# Patient Record
Sex: FEMALE | Race: WHITE | NOT HISPANIC OR LATINO | Employment: UNEMPLOYED | URBAN - METROPOLITAN AREA
[De-identification: names, ages, dates, MRNs, and addresses within clinical notes are randomized per-mention and may not be internally consistent; named-entity substitution may affect disease eponyms.]

---

## 2017-01-05 ENCOUNTER — ROUTINE PRENATAL (OUTPATIENT)
Dept: OBSTETRICS AND GYNECOLOGY | Facility: CLINIC | Age: 27
End: 2017-01-05
Payer: MEDICAID

## 2017-01-05 VITALS
BODY MASS INDEX: 35.13 KG/M2 | WEIGHT: 231.06 LBS | DIASTOLIC BLOOD PRESSURE: 72 MMHG | SYSTOLIC BLOOD PRESSURE: 120 MMHG

## 2017-01-05 DIAGNOSIS — Z34.82 ENCOUNTER FOR SUPERVISION OF OTHER NORMAL PREGNANCY IN SECOND TRIMESTER: Primary | ICD-10-CM

## 2017-01-05 DIAGNOSIS — Z36.89 ENCOUNTER FOR FETAL ANATOMIC SURVEY: ICD-10-CM

## 2017-01-05 PROCEDURE — 99212 OFFICE O/P EST SF 10 MIN: CPT | Mod: PBBFAC | Performed by: OBSTETRICS & GYNECOLOGY

## 2017-01-05 PROCEDURE — 99213 OFFICE O/P EST LOW 20 MIN: CPT | Mod: TH,S$PBB,, | Performed by: OBSTETRICS & GYNECOLOGY

## 2017-01-05 PROCEDURE — 99999 PR PBB SHADOW E&M-EST. PATIENT-LVL II: CPT | Mod: PBBFAC,,, | Performed by: OBSTETRICS & GYNECOLOGY

## 2017-01-05 NOTE — MR AVS SNAPSHOT
[facility] - [facility]/ [facility]  [address] [facility]  [place] [address]  Phone: [phone]  Fax: [phone]                  Yaa Richmond   2017 8:45 AM   Routine Prenatal    Description:  Female : 1990   Provider:  Rossi Gayle CNM   Department:  [facility] - [facility]/ [facility]           Diagnoses this Visit        Comments    Encounter for supervision of other normal pregnancy in second trimester    -  Primary     Encounter for lactation counseling         Encounter for fetal anatomic survey                To Do List           Future Appointments        Provider Department Dept Phone    2017 12:30 PM ULTRASOUND, OB-GYN [facility] OB/ -[phone]    2017 1:30 PM [facility] OB/ -[phone]      Goals (5 Years of Data)     None      [facility]     [facility] On Call Nurse [facility] -  Assistance  Registered nurses in the [facility] Center provide clinical advisement, health education, appointment booking, and other advisory services.  Call for this free service at [phone].             Medications           Message regarding Medications     Verify the changes and/or additions to your medication regime listed below are the same as discussed with your clinician today.  If any of these changes or additions are incorrect, please notify your healthcare provider.             Verify that the below list of medications is an accurate representation of the medications you are currently taking.  If none reported, the list may be blank. If incorrect, please contact your healthcare provider. Carry this list with you in case of emergency.           Current Medications     promethazine (PHENERGAN) 25 MG tablet Take 1 tablet (25 mg total) by mouth every 4 (four) hours as needed for Nausea.           Clinical Reference Information           Prenatal Vitals     Enc. Date GA Prenatal Vitals Prenatal Pulse Pain Level Urine Albumin/Glucose Edema Presentation  Dilation/Effacement/Station    17 16w5d 120/72 / 104.8 kg (231 lb 0.7 oz)  /  / Present  0        16 12w4d 122/84 / 102.7 kg (226 lb 6.6 oz)  / 144 / Absent   Negative / Negative None / None / None / No      16 8w4d 118/74 / 102.7 kg (226 lb 6.6 oz)  / us 174 / Absent  0  None / None / None / No         TW.1 kg (4 lb 10.1 oz)   Pregravid weight: 102.7 kg (226 lb 6.6 oz)   Number of fetuses: 1       Vital Signs - Last Recorded  Most recent update: 2017  8:50 AM by Jonathan Jones MA    BP Wt BMI          120/72 104.8 kg (231 lb 0.7 oz) 35.13 kg/m2        Allergies as of 2017     No Known Allergies      Immunizations Administered on Date of Encounter - 2017     None      Orders Placed During Today's Visit     Future Labs/Procedures Expected by Expires    US OB/GYN Procedure (Viewpoint)-Future  As directed 2018      Smoking Cessation     If you would like to quit smoking:   You may be eligible for free services if you are a [place] resident and started smoking cigarettes before 1988.  Call the Smoking Cessation Trust (SCT) toll free at [phone] or [phone].   Call 3-379-QUIT-NOW if you do not meet the above criteria.

## 2017-01-05 NOTE — PROGRESS NOTES
"Anatomy next visit  "Choosing the Right Start" handout provided  Still smoking 1/2 PPD, encouraged to quit    Coffective counseling sheet Build Your Team discussed with mother. Reinforced importance of early identification of support team including champion, OB provider, pediatrician and local community resources. Encouraged mother to download Coffective mobile jessica if she has not already done so.  Mother verbalizes understanding.    "

## 2017-01-26 ENCOUNTER — ROUTINE PRENATAL (OUTPATIENT)
Dept: OBSTETRICS AND GYNECOLOGY | Facility: CLINIC | Age: 27
End: 2017-01-26
Payer: MEDICAID

## 2017-01-26 ENCOUNTER — PROCEDURE VISIT (OUTPATIENT)
Dept: OBSTETRICS AND GYNECOLOGY | Facility: CLINIC | Age: 27
End: 2017-01-26
Payer: MEDICAID

## 2017-01-26 VITALS
WEIGHT: 226.44 LBS | DIASTOLIC BLOOD PRESSURE: 74 MMHG | BODY MASS INDEX: 34.43 KG/M2 | SYSTOLIC BLOOD PRESSURE: 116 MMHG

## 2017-01-26 DIAGNOSIS — Z34.80 SUPERVISION OF OTHER NORMAL PREGNANCY: Primary | ICD-10-CM

## 2017-01-26 DIAGNOSIS — Z36.89 ENCOUNTER FOR FETAL ANATOMIC SURVEY: ICD-10-CM

## 2017-01-26 PROCEDURE — 99213 OFFICE O/P EST LOW 20 MIN: CPT | Mod: TH,S$PBB,, | Performed by: OBSTETRICS & GYNECOLOGY

## 2017-01-26 PROCEDURE — 76805 OB US >/= 14 WKS SNGL FETUS: CPT | Mod: 26,S$PBB,, | Performed by: OBSTETRICS & GYNECOLOGY

## 2017-01-26 PROCEDURE — 99212 OFFICE O/P EST SF 10 MIN: CPT | Mod: PBBFAC | Performed by: OBSTETRICS & GYNECOLOGY

## 2017-01-26 PROCEDURE — 99999 PR PBB SHADOW E&M-EST. PATIENT-LVL II: CPT | Mod: PBBFAC,,, | Performed by: OBSTETRICS & GYNECOLOGY

## 2017-01-26 RX ORDER — PANTOPRAZOLE SODIUM 20 MG/1
20 TABLET, DELAYED RELEASE ORAL DAILY
Qty: 30 TABLET | Refills: 1 | Status: SHIPPED | OUTPATIENT
Start: 2017-01-26 | End: 2017-04-20 | Stop reason: SDUPTHER

## 2017-01-26 RX ORDER — BUTALBITAL, ACETAMINOPHEN AND CAFFEINE 50; 325; 40 MG/1; MG/1; MG/1
1 TABLET ORAL EVERY 4 HOURS PRN
Qty: 30 TABLET | Refills: 1 | Status: SHIPPED | OUTPATIENT
Start: 2017-01-26 | End: 2017-02-25

## 2017-01-26 NOTE — MR AVS SNAPSHOT
[facility]  [address] [facility]  [place] [address]  Phone: [phone]  Fax: [phone]                  Yaa Richmond   2017 1:30 PM   Routine Prenatal    Description:  Female : 1990   Provider:  Rossi Gayle CNM   Department:  [facility]           Reason for Visit     Routine Prenatal Visit                To Do List           Future Appointments        Provider Department Dept Phone    2017 1:30 PM [facility]TE [facility] - OB/ -[phone]    2017 9:30 AM ULTRASOUND, OB-GYN [facility] - OB/ -[phone]    2017 10:00 AM [facility] [place] - OB/ -[phone]      Goals (5 Years of Data)     None      [facility]     [facility] Nurse [facility] -  Assistance  Registered nurses in the [facility] provide clinical advisement, health education, appointment booking, and other advisory services.  Call for this free service at [phone].             Medications           Message regarding Medications     Verify the changes and/or additions to your medication regime listed below are the same as discussed with your clinician today.  If any of these changes or additions are incorrect, please notify your healthcare provider.             Verify that the below list of medications is an accurate representation of the medications you are currently taking.  If none reported, the list may be blank. If incorrect, please contact your healthcare provider. Carry this list with you in case of emergency.           Current Medications     promethazine (PHENERGAN) 25 MG tablet Take 1 tablet (25 mg total) by mouth every 4 (four) hours as needed for Nausea.           Clinical Reference Information           Prenatal Vitals     Enc. Date GA Prenatal Vitals Prenatal Pulse Pain Level Urine Albumin/Glucose Edema Presentation Dilation/Effacement/Station    17 19w5d 116/74 / 102.7 kg (226 lb 6.6 oz)  / us 151  / Present  0 Negative / Negative       17 16w5d 120/72 / 104.8 kg (231 lb 0.7 oz)  / 141 / Present  0 Negative / Negative None / None / None / No      16 12w4d 122/84 / 102.7 kg (226 lb 6.6 oz)  / 144 / Absent   Negative / Negative None / None / None / No      16 8w4d 118/74 / 102.7 kg (226 lb 6.6 oz)  / us 174 / Absent  0  None / None / None / No         TW kg (0 lb)   Pregravid weight: 102.7 kg (226 lb 6.6 oz)   Number of fetuses: 1       Vital Signs - Last Recorded  Most recent update: 2017  1:13 PM by Debra Hernandez MA    BP Wt BMI          116/74 102.7 kg (226 lb 6.6 oz) 34.43 kg/m2        Allergies as of 2017     No Known Allergies      Immunizations Administered on Date of Encounter - 2017     None      Smoking Cessation     If you would like to quit smoking:   You may be eligible for free services if you are a [place] resident and started smoking cigarettes before 1988.  Call the Smoking Cessation Trust (SCT) toll free at ([phone] 072-4166 or [phone].   Call 4-582-QUIT-NOW if you do not meet the above criteria.

## 2017-01-26 NOTE — PROGRESS NOTES
"Anatomy with suboptimal views, will rescan next visit  It's a boy! "James Pleitez"  + quickening  C/o lack of appetite and heartburn unrelieved with Tums - rx sent  C/o  Headache unrelieved with Tylenol - rx sent    Coffective counseling sheet Get Ready discussed with mother. Reinforced avoiding induction of labor unless medically indicated as well as comfort measures during labor.  Encouraged mother to download Coffective mobile jessica if she has not already done so. Mother verbalizes understanding.    "

## 2017-02-11 ENCOUNTER — NURSE TRIAGE (OUTPATIENT)
Dept: ADMINISTRATIVE | Facility: CLINIC | Age: 27
End: 2017-02-11

## 2017-02-11 NOTE — TELEPHONE ENCOUNTER
"  Reason for Disposition   Cold with no complications  (all triage questions negative)   Fever with no signs of serious infection AND no localizing symptoms  (all other triage questions negative)    Answer Assessment - Initial Assessment Questions  1. ONSET: "When did the nasal discharge start?"       This morning   2. AMOUNT: "How much discharge is there?"       Moderate  3. COUGH: "Do you have a cough?" If yes, ask: "Describe the color of your sputum" (clear, white, yellow, green)      Yes, with greenish mucus  4. RESPIRATORY DISTRESS: "Describe your breathing."       No   5. FEVER: "Do you have a fever?" If so, ask: "What is your temperature, how was it measured, and when did it start?"      99.7 (O)   6. SEVERITY: "Overall, how bad are you feeling right now?" (e.g., doesn't interfere with normal activities, staying home from school/work, staying in bed)       Blocked nasal passage   7. OTHER SYMPTOMS: "Do you have any other symptoms?" (e.g., sore throat, earache, wheezing, vomiting)      Mild sore throat   8. PREGNANCY: "Is there any chance you are pregnant?" "When was your last menstrual period?"      5 months    Answer Assessment - Initial Assessment Questions  1. TEMPERATURE: "What is the most recent temperature?"  "How was it measured?"       99.7 (O)   2. ONSET: "When did the fever start?"       This morning   3. SYMPTOMS: "Do you have any other symptoms besides the fever?"   (e.g., cough, cold symptoms, sore throat, rash, diarrhea, vomiting, abdominal pain, urine problems)      Cold/cough   4. CAUSE: If there are no symptoms, ask: "What do you think is causing the fever?"       Daughter has URI since last Wednesday   5. CONTACTS: "Does anyone else in the family have an infection?"      Daughter   6. TREATMENT: "What have you done so far to treat this fever?" (e.g., medications)      None  7. IMMUNOCOMPROMISE: "Do you have of the following: diabetes, HIV positive, splenectomy, chronic steroid treatment, " "transplant patient, etc."      8. OTHER SYMPTOMS: "Do you have any other symptoms?" (e.g., abdominal pain, fever, vaginal   bleeding, decreased fetal movement)      No  9. SAI: "What date are you expecting to deliver?      21 weeks   10. TRAVEL: "Have you traveled out of the country in the last month?" (e.g., travel history, exposures)        n/a    Protocols used:  COLDS-A-,  PREGNANCY - FEVER-A-    "

## 2017-02-23 ENCOUNTER — PROCEDURE VISIT (OUTPATIENT)
Dept: OBSTETRICS AND GYNECOLOGY | Facility: CLINIC | Age: 27
End: 2017-02-23
Payer: MEDICAID

## 2017-02-23 ENCOUNTER — ROUTINE PRENATAL (OUTPATIENT)
Dept: OBSTETRICS AND GYNECOLOGY | Facility: CLINIC | Age: 27
End: 2017-02-23
Payer: MEDICAID

## 2017-02-23 VITALS
BODY MASS INDEX: 34.96 KG/M2 | SYSTOLIC BLOOD PRESSURE: 118 MMHG | DIASTOLIC BLOOD PRESSURE: 78 MMHG | WEIGHT: 229.94 LBS

## 2017-02-23 DIAGNOSIS — Z34.80 SUPERVISION OF OTHER NORMAL PREGNANCY: Primary | ICD-10-CM

## 2017-02-23 PROCEDURE — 99213 OFFICE O/P EST LOW 20 MIN: CPT | Mod: TH,S$PBB,, | Performed by: OBSTETRICS & GYNECOLOGY

## 2017-02-23 PROCEDURE — 76816 OB US FOLLOW-UP PER FETUS: CPT | Mod: 26,S$PBB,, | Performed by: OBSTETRICS & GYNECOLOGY

## 2017-02-23 PROCEDURE — 99212 OFFICE O/P EST SF 10 MIN: CPT | Mod: PBBFAC | Performed by: OBSTETRICS & GYNECOLOGY

## 2017-02-23 PROCEDURE — 99999 PR PBB SHADOW E&M-EST. PATIENT-LVL II: CPT | Mod: PBBFAC,,, | Performed by: OBSTETRICS & GYNECOLOGY

## 2017-02-23 NOTE — PROGRESS NOTES
Anatomy still with sub-optimal 4CH - will recheck next visit  Heart burn and headaches better  28 week labs next visit  PTL precautions    Coffective counseling sheet Fall In Love discussed with mother. Reinforced immediate skin to skin, the magic first hour, importance of the first feeding and delaying routine procedures. Encouraged mother to download Coffective mobile jessica if she has not already done so. Mother verbalizes understanding.

## 2017-02-23 NOTE — MR AVS SNAPSHOT
[facility]/ [facility]  [address] [facility]  [place] [address]  Phone: [phone]  Fax: [phone]                  Yaa Richmond   2017 10:00 AM   Routine Prenatal    Description:  Female : 1990   Provider:  Rossi Gayle CNM   Department:  [facility]/ [facility]           Reason for Visit     Routine Prenatal Visit           Diagnoses this Visit        Comments    Supervision of other normal pregnancy    -  Primary     Encounter for lactation counseling         Evaluate anatomy not seen on prior sonogram                To Do List           Future Appointments        Provider Department Dept Phone    3/23/2017 10:00 AM ULTRASOUND, OB-GYN [facility] OB/ -[phone]    3/23/2017 10:30 AM LABORATORY, [address] [facility] [phone]    3/23/2017 11:00 AM Rossi Gayle CNM [facility] OB/ -[phone]      Goals (5 Years of Data)     None      [facility] On Call     [facility] Nurse Care Line -  Assistance  Registered nurses in the [facility] provide clinical advisement, health education, appointment booking, and other advisory services.  Call for this free service at [phone].             Medications           Message regarding Medications     Verify the changes and/or additions to your medication regime listed below are the same as discussed with your clinician today.  If any of these changes or additions are incorrect, please notify your healthcare provider.             Verify that the below list of medications is an accurate representation of the medications you are currently taking.  If none reported, the list may be blank. If incorrect, please contact your healthcare provider. Carry this list with you in case of emergency.           Current Medications     butalbital-acetaminophen-caffeine -40 mg (FIORICET, ESGIC) -40 mg per tablet Take 1 tablet by mouth every 4 (four) hours as needed for Pain or  Headaches.    pantoprazole (PROTONIX) 20 MG tablet Take 1 tablet (20 mg total) by mouth once daily.    promethazine (PHENERGAN) 25 MG tablet Take 1 tablet (25 mg total) by mouth every 4 (four) hours as needed for Nausea.           Clinical Reference Information           Prenatal Vitals     Enc. Date GA Prenatal Vitals Prenatal Pulse Pain Level Urine Albumin/Glucose Edema Presentation Dilation/Effacement/Station    17 23w5d 118/78 / 104.3 kg (229 lb 15 oz)  / us 166 / Present  0 Negative / Negative None / None / None / No      17 19w5d 116/74 / 102.7 kg (226 lb 6.6 oz)  / us 151 / Present  0 Negative / Negative None / None / None / No      17 16w5d 120/72 / 104.8 kg (231 lb 0.7 oz)  / 141 / Present  0 Negative / Negative None / None / None / No      16 12w4d 122/84 / 102.7 kg (226 lb 6.6 oz)  / 144 / Absent   Negative / Negative None / None / None / No      16 8w4d 118/74 / 102.7 kg (226 lb 6.6 oz)  / us 174 / Absent  0  None / None / None / No         TW.6 kg (3 lb 8.4 oz)   Pregravid weight: 102.7 kg (226 lb 6.6 oz)   Number of fetuses: 1       Your Vitals Were     BP Weight BMI          118/78 104.3 kg (229 lb 15 oz) 34.96 kg/m2        Allergies as of 2017     No Known Allergies      Immunizations Administered on Date of Encounter - 2017     None      Orders Placed During Today's Visit     Future Labs/Procedures Expected by Expires    CBC auto differential  2017    HIV-1 and HIV-2 antibodies  2017    OB Glucose Screen  2017    RPR  2017    US OB/GYN Procedure (Viewpoint)-Future  As directed 2018      Smoking Cessation     If you would like to quit smoking:   You may be eligible for free services if you are a [place] resident and started smoking cigarettes before 1988.  Call the Smoking Cessation Trust (SCT) toll free at ([phone] [phone] or [phone].   Call 6-548-QUIT-NOW if you do not meet  the above criteria.            Language Assistance Services     ATTENTION: Language assistance services are available, free of charge. Please call [phone].      ATENCIÓN: Si habla jonathon, tiene a mckeon disposición servicios gratuitos de asistencia lingüística. Llame al [phone].     CHÚ Ý: N?u b?n nói Ti?ng Vi?t, có các d?ch v? h? tr? ngôn ng? mi?n phí dành cho b?n. G?i s? [phone].         [facility] complies with applicable Federal civil rights laws and does not discriminate on the basis of race, color, national origin, age, disability, or sex.

## 2017-03-23 ENCOUNTER — ROUTINE PRENATAL (OUTPATIENT)
Dept: OBSTETRICS AND GYNECOLOGY | Facility: CLINIC | Age: 27
End: 2017-03-23
Payer: MEDICAID

## 2017-03-23 ENCOUNTER — LAB VISIT (OUTPATIENT)
Dept: LAB | Facility: HOSPITAL | Age: 27
End: 2017-03-23
Attending: OBSTETRICS & GYNECOLOGY
Payer: MEDICAID

## 2017-03-23 ENCOUNTER — PROCEDURE VISIT (OUTPATIENT)
Dept: OBSTETRICS AND GYNECOLOGY | Facility: CLINIC | Age: 27
End: 2017-03-23
Payer: MEDICAID

## 2017-03-23 VITALS
DIASTOLIC BLOOD PRESSURE: 72 MMHG | WEIGHT: 233.69 LBS | BODY MASS INDEX: 35.53 KG/M2 | SYSTOLIC BLOOD PRESSURE: 116 MMHG

## 2017-03-23 DIAGNOSIS — Z34.80 SUPERVISION OF OTHER NORMAL PREGNANCY: Primary | ICD-10-CM

## 2017-03-23 DIAGNOSIS — Z34.80 SUPERVISION OF OTHER NORMAL PREGNANCY: ICD-10-CM

## 2017-03-23 LAB
BASOPHILS # BLD AUTO: 0.02 K/UL
BASOPHILS NFR BLD: 0.2 %
DIFFERENTIAL METHOD: ABNORMAL
EOSINOPHIL # BLD AUTO: 0.1 K/UL
EOSINOPHIL NFR BLD: 1.1 %
ERYTHROCYTE [DISTWIDTH] IN BLOOD BY AUTOMATED COUNT: 13.5 %
GLUCOSE SERPL-MCNC: 115 MG/DL
HCT VFR BLD AUTO: 35 %
HGB BLD-MCNC: 11.8 G/DL
LYMPHOCYTES # BLD AUTO: 2.9 K/UL
LYMPHOCYTES NFR BLD: 25.5 %
MCH RBC QN AUTO: 30.5 PG
MCHC RBC AUTO-ENTMCNC: 33.7 %
MCV RBC AUTO: 90 FL
MONOCYTES # BLD AUTO: 0.4 K/UL
MONOCYTES NFR BLD: 3.7 %
NEUTROPHILS # BLD AUTO: 7.9 K/UL
NEUTROPHILS NFR BLD: 69.1 %
PLATELET # BLD AUTO: 216 K/UL
PMV BLD AUTO: 10.8 FL
RBC # BLD AUTO: 3.87 M/UL
WBC # BLD AUTO: 11.42 K/UL

## 2017-03-23 PROCEDURE — 99999 PR PBB SHADOW E&M-EST. PATIENT-LVL II: CPT | Mod: PBBFAC,,, | Performed by: OBSTETRICS & GYNECOLOGY

## 2017-03-23 PROCEDURE — 99213 OFFICE O/P EST LOW 20 MIN: CPT | Mod: TH,S$PBB,, | Performed by: OBSTETRICS & GYNECOLOGY

## 2017-03-23 PROCEDURE — 76815 OB US LIMITED FETUS(S): CPT | Mod: 26,S$PBB,, | Performed by: OBSTETRICS & GYNECOLOGY

## 2017-03-23 PROCEDURE — 99212 OFFICE O/P EST SF 10 MIN: CPT | Mod: PBBFAC,25 | Performed by: OBSTETRICS & GYNECOLOGY

## 2017-03-23 NOTE — MR AVS SNAPSHOT
[facility]  [address] [facility]  [place] [address]  Phone: [phone]  Fax: [phone]                  Yaa Richmond   3/23/2017 11:00 AM   Routine Prenatal    Description:  Female : 1990   Provider:  Rossi Gayle CNM   Department:  [facility]/ [facility]           Reason for Visit     Routine Prenatal Visit           Diagnoses this Visit        Comments    Supervision of other normal pregnancy    -  Primary     Encounter for lactation counseling                To Do List           Future Appointments        Provider Department Dept Phone    2017 9:00 AM [facility] OB/ -[phone]      Goals (5 Years of Data)     None      [facility]     [facility] On Call Nurse Care Line -  Assistance  Registered nurses in the [facility] On Call Center provide clinical advisement, health education, appointment booking, and other advisory services.  Call for this free service at [phone].             Medications           Message regarding Medications     Verify the changes and/or additions to your medication regime listed below are the same as discussed with your clinician today.  If any of these changes or additions are incorrect, please notify your healthcare provider.             Verify that the below list of medications is an accurate representation of the medications you are currently taking.  If none reported, the list may be blank. If incorrect, please contact your healthcare provider. Carry this list with you in case of emergency.           Current Medications     pantoprazole (PROTONIX) 20 MG tablet Take 1 tablet (20 mg total) by mouth once daily.    promethazine (PHENERGAN) 25 MG tablet Take 1 tablet (25 mg total) by mouth every 4 (four) hours as needed for Nausea.           Clinical Reference Information           Prenatal Vitals     Enc. Date GA Prenatal Vitals Prenatal Pulse Pain Level Urine Albumin/Glucose Edema Presentation  Dilation/Effacement/Station    3/23/17 27w5d 116/72 / 106 kg (233 lb 11 oz)  / us 142 / Present          2/23/17 23w5d 118/78 / 104.3 kg (229 lb 15 oz)  / us 166 / Present  0 Negative / Negative None / None / None / No      1/26/17 19w5d 116/74 / 102.7 kg (226 lb 6.6 oz)  / us 151 / Present  0 Negative / Negative None / None / None / No      1/5/17 16w5d 120/72 / 104.8 kg (231 lb 0.7 oz)  / 141 / Present  0 Negative / Negative None / None / None / No      12/7/16 12w4d 122/84 / 102.7 kg (226 lb 6.6 oz)  / 144 / Absent   Negative / Negative None / None / None / No      11/9/16 8w4d 118/74 / 102.7 kg (226 lb 6.6 oz)  / us 174 / Absent  0  None / None / None / No         TWG: 3.3 kg (7 lb 4.4 oz)   Pregravid weight: 102.7 kg (226 lb 6.6 oz)   Number of fetuses: 1       Your Vitals Were     BP Weight BMI          116/72 106 kg (233 lb 11 oz) 35.53 kg/m2        Allergies as of 3/23/2017     No Known Allergies      Immunizations Administered on Date of Encounter - 3/23/2017     None      Smoking Cessation     If you would like to quit smoking:   You may be eligible for free services if you are a [place] resident and started smoking cigarettes before September 1, 1988.  Call the Smoking Cessation Trust ([facility]) toll free at ([phone] 472-1715 or [phone] [phone].   Call 1-800-QUIT-NOW if you do not meet the above criteria.            Language Assistance Services     ATTENTION: Language assistance services are available, free of charge. Please call [phone].      ATENCIÓN: Si avla jonathon, tiene a mckeon disposición servicios gratuitos de asistencia lingüística. Llame al [phone].     CHÚ Ý: N?u b?n nói Ti?ng Vi?t, có các d?ch v? h? tr? ngôn ng? mi?n phí dành cho b?n. G?i s? [phone].         [facility] complies with applicable Federal civil rights laws and does not discriminate on the basis of race, color, national origin, age, disability, or sex.

## 2017-03-23 NOTE — PROGRESS NOTES
28 week labs today. Declines TDAP.   PTL precautions and FKCs  Anatomy complete. Growth appropriate  Planning epidural in labor and FF; will try BF also  BTL consent signed; aware may not be able to do immediate PP  Desires circ for baby boy    Coffective counseling sheet Keep Baby Close discussed with mother. Reinforced rooming in practices, continued skin to skin, and quiet hours as requested by mother.  Encouraged mother to download Coffective mobile jessica if she has not already done so. Mother verbalizes understanding.

## 2017-03-24 LAB
HIV 1+2 AB+HIV1 P24 AG SERPL QL IA: NEGATIVE
RPR SER QL: NORMAL

## 2017-04-20 ENCOUNTER — ROUTINE PRENATAL (OUTPATIENT)
Dept: OBSTETRICS AND GYNECOLOGY | Facility: CLINIC | Age: 27
End: 2017-04-20
Payer: MEDICAID

## 2017-04-20 VITALS — SYSTOLIC BLOOD PRESSURE: 108 MMHG | WEIGHT: 234.13 LBS | DIASTOLIC BLOOD PRESSURE: 62 MMHG | BODY MASS INDEX: 35.6 KG/M2

## 2017-04-20 DIAGNOSIS — Z34.80 SUPERVISION OF OTHER NORMAL PREGNANCY: Primary | ICD-10-CM

## 2017-04-20 PROCEDURE — 99999 PR PBB SHADOW E&M-EST. PATIENT-LVL II: CPT | Mod: PBBFAC,,, | Performed by: OBSTETRICS & GYNECOLOGY

## 2017-04-20 PROCEDURE — 99212 OFFICE O/P EST SF 10 MIN: CPT | Mod: PBBFAC | Performed by: OBSTETRICS & GYNECOLOGY

## 2017-04-20 PROCEDURE — 99213 OFFICE O/P EST LOW 20 MIN: CPT | Mod: TH,S$PBB,, | Performed by: OBSTETRICS & GYNECOLOGY

## 2017-04-20 RX ORDER — PANTOPRAZOLE SODIUM 20 MG/1
20 TABLET, DELAYED RELEASE ORAL DAILY
Qty: 30 TABLET | Refills: 1 | Status: ON HOLD | OUTPATIENT
Start: 2017-04-20 | End: 2017-06-11 | Stop reason: HOSPADM

## 2017-04-20 NOTE — MR AVS SNAPSHOT
[facility]  [address] [facility]  [place] [address]  Phone: [phone]  Fax: [phone]                  Yaa Richmond   2017 9:00 AM   Routine Prenatal    Description:  Female : 1990   Provider:  Rossi Gayle CNM   Department:  [facility]           Reason for Visit     Routine Prenatal Visit                To Do List           Future Appointments        Provider Department Dept Phone    2017 9:00 AM [facility] - OB/ -[phone]    2017 9:15 AM [facility] - OB/ -[phone]      Goals (5 Years of Data)     None      [facility]     [facility] On Call Nurse Care Line -  Assistance  Unless otherwise directed by your provider, please contact [facility], our nurse care line that is available for  assistance.     Registered nurses in the [facility] Center provide: appointment scheduling, clinical advisement, health education, and other advisory services.  Call: [phone] (toll free)               Medications           Message regarding Medications     Verify the changes and/or additions to your medication regime listed below are the same as discussed with your clinician today.  If any of these changes or additions are incorrect, please notify your healthcare provider.        STOP taking these medications     promethazine (PHENERGAN) 25 MG tablet Take 1 tablet (25 mg total) by mouth every 4 (four) hours as needed for Nausea.           Verify that the below list of medications is an accurate representation of the medications you are currently taking.  If none reported, the list may be blank. If incorrect, please contact your healthcare provider. Carry this list with you in case of emergency.           Current Medications     pantoprazole (PROTONIX) 20 MG tablet Take 1 tablet (20 mg total) by mouth once daily.           Clinical Reference Information           Prenatal Vitals     Enc.  Date GA Prenatal Vitals Prenatal Pulse Pain Level Urine Albumin/Glucose Edema Presentation Dilation/Effacement/Station    4/20/17 31w5d 108/62 / 106.2 kg (234 lb 2.1 oz)  / 155 / Present  0  None / None / None / No      3/23/17 27w5d 116/72 / 106 kg (233 lb 11 oz)  / us 142 / Present    None / None / None / No      2/23/17 23w5d 118/78 / 104.3 kg (229 lb 15 oz)  / us 166 / Present  0 Negative / Negative None / None / None / No      1/26/17 19w5d 116/74 / 102.7 kg (226 lb 6.6 oz)  / us 151 / Present  0 Negative / Negative None / None / None / No      1/5/17 16w5d 120/72 / 104.8 kg (231 lb 0.7 oz)  / 141 / Present  0 Negative / Negative None / None / None / No      12/7/16 12w4d 122/84 / 102.7 kg (226 lb 6.6 oz)  / 144 / Absent   Negative / Negative None / None / None / No      11/9/16 8w4d 118/74 / 102.7 kg (226 lb 6.6 oz)  / us 174 / Absent  0  None / None / None / No         TWG: 3.5 kg (7 lb 11.5 oz)   Pregravid weight: 102.7 kg (226 lb 6.6 oz)   Number of fetuses: 1       Your Vitals Were     BP Weight BMI          108/62 106.2 kg (234 lb 2.1 oz) 35.6 kg/m2        Allergies as of 4/20/2017     No Known Allergies      Immunizations Administered on Date of Encounter - 4/20/2017     None      Smoking Cessation     If you would like to quit smoking:   You may be eligible for free services if you are a [place] resident and started smoking cigarettes before September 1, 1988.  Call the Smoking Cessation Trust (SCT) toll free at (844) 439-4836 or ([phone].   Call 1-800-QUIT-NOW if you do not meet the above criteria.   Contact us via email: [email or web address]   View our website for more information: [email or web address]        Language Assistance Services     ATTENTION: Language assistance services are available, free of charge. Please call [phone].      ATENCIÓN: Si habla español, tiene a mckeon disposición servicios gratuitos de asistencia lingüística. Llame al [phone].     CHÚ Ý: N?u  b?n nói Ti?ng Vi?t, có các d?ch v? h? tr? ngôn ng? mi?n phí dành cho b?n. G?i s? [phone].         [facility] complies with applicable Federal civil rights laws and does not discriminate on the basis of race, color, national origin, age, disability, or sex.

## 2017-04-20 NOTE — PROGRESS NOTES
Labs WNL  Pediatrician chosen  PTL precautions and [facility]    Coffective counseling sheet Nourish discussed with mother. Reinforced basic breastfeeding position and latch as well as proper hand expression technique. Encouraged mother to download Coffective mobile jessica if she has not already done so.  Mother verbalizes understanding.

## 2017-05-04 ENCOUNTER — ROUTINE PRENATAL (OUTPATIENT)
Dept: OBSTETRICS AND GYNECOLOGY | Facility: CLINIC | Age: 27
End: 2017-05-04
Payer: MEDICAID

## 2017-05-04 VITALS
DIASTOLIC BLOOD PRESSURE: 64 MMHG | SYSTOLIC BLOOD PRESSURE: 118 MMHG | BODY MASS INDEX: 35.53 KG/M2 | WEIGHT: 233.69 LBS

## 2017-05-04 DIAGNOSIS — Z34.80 SUPERVISION OF OTHER NORMAL PREGNANCY: Primary | ICD-10-CM

## 2017-05-04 PROCEDURE — 99213 OFFICE O/P EST LOW 20 MIN: CPT | Mod: TH,S$PBB,, | Performed by: OBSTETRICS & GYNECOLOGY

## 2017-05-04 PROCEDURE — 99999 PR PBB SHADOW E&M-EST. PATIENT-LVL II: CPT | Mod: PBBFAC,,, | Performed by: OBSTETRICS & GYNECOLOGY

## 2017-05-04 PROCEDURE — 99212 OFFICE O/P EST SF 10 MIN: CPT | Mod: PBBFAC | Performed by: OBSTETRICS & GYNECOLOGY

## 2017-05-04 NOTE — PROGRESS NOTES
GBS next visit  PTL precautions and FKCs    Coffective counseling sheet Nourish discussed with mother. Reinforced basic breastfeeding position and latch as well as proper hand expression technique. Encouraged mother to download Coffective mobile jessica if she has not already done so.  Mother verbalizes understanding.

## 2017-05-04 NOTE — MR AVS SNAPSHOT
[facility]  [address]  [place] [address]  Phone: [phone]  Fax: [phone]                  Yaa Richmond   2017 9:15 AM   Routine Prenatal    Description:  Female : 1990   Provider:  Rossi Gayle CNM   Department:  [facility]           Reason for Visit     Routine Prenatal Visit           Diagnoses this Visit        Comments    Supervision of other normal pregnancy    -  Primary     Encounter for lactation counseling                To Do List           Future Appointments        Provider Department Dept Phone    2017 9:45 AM DIONTE [facility]/ -[phone]      Goals (5 Years of Data)     None      Follow-Up and Disposition     Return in about 2 weeks (around 2017).    Follow-up and Disposition History      [facility]     [facility] On [facility] Nurse Care Line - / Assistance  Unless otherwise directed by your provider, please contact [facility], our nurse care line that is available for  assistance.     Registered nurses in the [facility] On Call Center provide: appointment scheduling, clinical advisement, health education, and other advisory services.  Call: [phone] (toll free)               Medications           Message regarding Medications     Verify the changes and/or additions to your medication regime listed below are the same as discussed with your clinician today.  If any of these changes or additions are incorrect, please notify your healthcare provider.             Verify that the below list of medications is an accurate representation of the medications you are currently taking.  If none reported, the list may be blank. If incorrect, please contact your healthcare provider. Carry this list with you in case of emergency.           Current Medications     pantoprazole (PROTONIX) 20 MG tablet Take 1 tablet (20 mg total) by mouth once daily.           Clinical Reference Information            Prenatal Vitals     Enc. Date GA Prenatal Vitals Prenatal Pulse Pain Level Urine Albumin/Glucose Edema Presentation Dilation/Effacement/Station    5/4/17 33w5d 118/64 / 106 kg (233 lb 11 oz) 34 cm / 160 / Present  0  None / None / None / No      4/20/17 31w5d 108/62 / 106.2 kg (234 lb 2.1 oz) 32 cm / 155 / Present  0  None / None / None / No      3/23/17 27w5d 116/72 / 106 kg (233 lb 11 oz)  / us 142 / Present    None / None / None / No      2/23/17 23w5d 118/78 / 104.3 kg (229 lb 15 oz)  / us 166 / Present  0 Negative / Negative None / None / None / No      1/26/17 19w5d 116/74 / 102.7 kg (226 lb 6.6 oz)  / us 151 / Present  0 Negative / Negative None / None / None / No      1/5/17 16w5d 120/72 / 104.8 kg (231 lb 0.7 oz)  / 141 / Present  0 Negative / Negative None / None / None / No      12/7/16 12w4d 122/84 / 102.7 kg (226 lb 6.6 oz)  / 144 / Absent   Negative / Negative None / None / None / No      11/9/16 8w4d 118/74 / 102.7 kg (226 lb 6.6 oz)  / us 174 / Absent  0  None / None / None / No         TWG: 3.3 kg (7 lb 4.4 oz)   Pregravid weight: 102.7 kg (226 lb 6.6 oz)   Number of fetuses: 1       Your Vitals Were     BP Weight BMI          118/64 106 kg (233 lb 11 oz) 35.53 kg/m2        Allergies as of 5/4/2017     No Known Allergies      Immunizations Administered on Date of Encounter - 5/4/2017     None      Smoking Cessation     If you would like to quit smoking:   You may be eligible for free services if you are a [place] resident and started smoking cigarettes before September 1, 1988.  Call the Smoking Cessation Trust (SCT) toll free at (352) 863-8004 or (498) 482-5961.   Call 1-800-QUIT-NOW if you do not meet the above criteria.   Contact us via email: [email or web address]   View our website for more information: [email or web address]        Language Assistance Services     ATTENTION: Language assistance services are available, free of charge. Please call [phone].      ATENCIÓN: Si  habla skylarañol, tiene a mckeon disposición servicios gratuitos de asistencia lingüística. Llame al [phone].     CHÚ Ý: N?u b?n nói Ti?ng Vi?t, có các d?ch v? h? tr? ngôn ng? mi?n phí dành cho b?n. G?i s? [phone].         [facility] complies with applicable Federal civil rights laws and does not discriminate on the basis of race, color, national origin, age, disability, or sex.

## 2017-05-18 ENCOUNTER — ROUTINE PRENATAL (OUTPATIENT)
Dept: OBSTETRICS AND GYNECOLOGY | Facility: CLINIC | Age: 27
End: 2017-05-18
Payer: MEDICAID

## 2017-05-18 VITALS
WEIGHT: 237.63 LBS | BODY MASS INDEX: 36.14 KG/M2 | SYSTOLIC BLOOD PRESSURE: 108 MMHG | DIASTOLIC BLOOD PRESSURE: 68 MMHG

## 2017-05-18 DIAGNOSIS — Z34.80 SUPERVISION OF OTHER NORMAL PREGNANCY: Primary | ICD-10-CM

## 2017-05-18 PROCEDURE — 87081 CULTURE SCREEN ONLY: CPT

## 2017-05-18 PROCEDURE — 99212 OFFICE O/P EST SF 10 MIN: CPT | Mod: PBBFAC | Performed by: OBSTETRICS & GYNECOLOGY

## 2017-05-18 PROCEDURE — 99213 OFFICE O/P EST LOW 20 MIN: CPT | Mod: TH,S$PBB,, | Performed by: OBSTETRICS & GYNECOLOGY

## 2017-05-18 PROCEDURE — 99999 PR PBB SHADOW E&M-EST. PATIENT-LVL II: CPT | Mod: PBBFAC,,, | Performed by: OBSTETRICS & GYNECOLOGY

## 2017-05-18 NOTE — MR AVS SNAPSHOT
[facility]  [address]  [place] [address]  Phone: [phone]  Fax: [phone]                  Yaa Richmond   2017 9:45 AM   Routine Prenatal    Description:  Female : 1990   Provider:  Rossi Gayle CNM   Department:  [facility]           Reason for Visit     Routine Prenatal Visit           Diagnoses this Visit        Comments    Supervision of other normal pregnancy    -  Primary            To Do List           Future Appointments        Provider Department Dept Phone    2017 10:45 AM [place] CNM [facility]/ -[phone]      Goals (5 Years of Data)     None      Follow-Up and Disposition     Return in about 1 week (around 2017).    Follow-up and Disposition History      [facility]     [facility] On Call Nurse Care Line -  Assistance  Unless otherwise directed by your provider, please contact [facility], our nurse care line that is available for  assistance.     Registered nurses in the [facility] On Call Center provide: appointment scheduling, clinical advisement, health education, and other advisory services.  Call: [phone] (toll free)               Medications           Message regarding Medications     Verify the changes and/or additions to your medication regime listed below are the same as discussed with your clinician today.  If any of these changes or additions are incorrect, please notify your healthcare provider.             Verify that the below list of medications is an accurate representation of the medications you are currently taking.  If none reported, the list may be blank. If incorrect, please contact your healthcare provider. Carry this list with you in case of emergency.           Current Medications     pantoprazole (PROTONIX) 20 MG tablet Take 1 tablet (20 mg total) by mouth once daily.           Clinical Reference Information           Prenatal Vitals     Enc. Date GA Prenatal Vitals  Prenatal Pulse Pain Level Urine Albumin/Glucose Edema Presentation Dilation/Effacement/Station    17 35w5d 108/68 / 107.8 kg (237 lb 10.5 oz) 36 cm / 138 / Present  0  None / None / None / No   / -3    17 33w5d 118/64 / 106 kg (233 lb 11 oz) 34 cm / 160 / Present  0  None / None / None / No      17 31w5d 108/62 / 106.2 kg (234 lb 2.1 oz) 32 cm / 155 / Present  0  None / None / None / No      3/23/17 27w5d 116/72 / 106 kg (233 lb 11 oz)  / us 142 / Present    None / None / None / No      17 23w5d 118/78 / 104.3 kg (229 lb 15 oz)  / us 166 / Present  0 Negative / Negative None / None / None / No      17 19w5d 116/74 / 102.7 kg (226 lb 6.6 oz)  / us 151 / Present  0 Negative / Negative None / None / None / No      17 16w5d 120/72 / 104.8 kg (231 lb 0.7 oz)  / 141 / Present  0 Negative / Negative None / None / None / No      16 12w4d 122/84 / 102.7 kg (226 lb 6.6 oz)  / 144 / Absent   Negative / Negative None / None / None / No      16 8w4d 118/74 / 102.7 kg (226 lb 6.6 oz)  / us 174 / Absent  0  None / None / None / No         TW.1 kg (11 lb 3.9 oz)   Pregravid weight: 102.7 kg (226 lb 6.6 oz)   Number of fetuses: 1       Your Vitals Were     BP Weight BMI          108/68 107.8 kg (237 lb 10.5 oz) 36.14 kg/m2        Allergies as of 2017     No Known Allergies      Immunizations Administered on Date of Encounter - 2017     None      Orders Placed During Today's Visit      Normal Orders This Visit    Strep B Screen, Vaginal / Rectal       Smoking Cessation     If you would like to quit smoking:   You may be eligible for free services if you are a [place] resident and started smoking cigarettes before 1988.  Call the Smoking Cessation Trust (SCT) toll free at (319) 761-6270 or (586) 334-9934.   Call 5-220-QUIT-NOW if you do not meet the above criteria.   Contact us via email: [email or web address]   View our website for more information:  [email or web address]        Language Assistance Services     ATTENTION: Language assistance services are available, free of charge. Please call [phone].      ATENCIÓN: Si habneptali holt, tiene a mckeon disposición servicios gratuitos de asistencia lingüística. Llame al [phone].     CHÚ Ý: N?u b?n nói Ti?ng Vi?t, có các d?ch v? h? tr? ngôn ng? mi?n phí dành cho b?n. G?i s? [phone].         [facility] complies with applicable Federal civil rights laws and does not discriminate on the basis of race, color, national origin, age, disability, or sex.

## 2017-05-23 ENCOUNTER — ROUTINE PRENATAL (OUTPATIENT)
Dept: OBSTETRICS AND GYNECOLOGY | Facility: CLINIC | Age: 27
End: 2017-05-23
Payer: MEDICAID

## 2017-05-23 VITALS
SYSTOLIC BLOOD PRESSURE: 108 MMHG | BODY MASS INDEX: 36.14 KG/M2 | DIASTOLIC BLOOD PRESSURE: 70 MMHG | WEIGHT: 237.63 LBS

## 2017-05-23 DIAGNOSIS — Z34.80 SUPERVISION OF OTHER NORMAL PREGNANCY: Primary | ICD-10-CM

## 2017-05-23 LAB — BACTERIA SPEC AEROBE CULT: NORMAL

## 2017-05-23 PROCEDURE — 99999 PR PBB SHADOW E&M-EST. PATIENT-LVL II: CPT | Mod: PBBFAC,,, | Performed by: ADVANCED PRACTICE MIDWIFE

## 2017-05-23 PROCEDURE — 99213 OFFICE O/P EST LOW 20 MIN: CPT | Mod: TH,S$PBB,, | Performed by: ADVANCED PRACTICE MIDWIFE

## 2017-05-23 PROCEDURE — 99212 OFFICE O/P EST SF 10 MIN: CPT | Mod: PBBFAC | Performed by: ADVANCED PRACTICE MIDWIFE

## 2017-05-23 NOTE — PROGRESS NOTES
Exp neg GBS, head low, cx posterior/soft/ext os 4 cm int os difficult to reach. Coffective counseling sheet Learn Your Baby discussed with mother. Instructed regarding feeding cues and methods to calm baby. Encouraged mother to download Coffective mobile jessica if she has not already done so.  Mother verbalized understanding.   Coffective counseling sheet Protect Breastfeeding discussed with mother. Reinforced avoidence of artifical nipples and formula, unless medically indicated.  Encouraged mother to download Coffective mobile jessica if she has not already done so. Mother verbalizes understanding.  cx posterior, head low, int os difficult to reach, exp findings to pt. Ready for baby, s/s of labor reviewed. Coffective counseling sheet Learn Your Baby discussed with mother. Instructed regarding feeding cues and methods to calm baby. Encouraged mother to download Coffective mobile jessica if she has not already done so.  Mother verbalized understanding.   Coffective counseling sheet Protect Breastfeeding discussed with mother. Reinforced avoidence of artifical nipples and formula, unless medically indicated.  Encouraged mother to download Coffective mobile jessica if she has not already done so. Mother verbalizes understanding. al

## 2017-05-31 ENCOUNTER — ROUTINE PRENATAL (OUTPATIENT)
Dept: OBSTETRICS AND GYNECOLOGY | Facility: CLINIC | Age: 27
End: 2017-05-31
Payer: MEDICAID

## 2017-05-31 VITALS — BODY MASS INDEX: 36.34 KG/M2 | SYSTOLIC BLOOD PRESSURE: 108 MMHG | WEIGHT: 239 LBS | DIASTOLIC BLOOD PRESSURE: 68 MMHG

## 2017-05-31 DIAGNOSIS — Z34.80 SUPERVISION OF OTHER NORMAL PREGNANCY: Primary | ICD-10-CM

## 2017-05-31 PROCEDURE — 99999 PR PBB SHADOW E&M-EST. PATIENT-LVL II: CPT | Mod: PBBFAC,,, | Performed by: OBSTETRICS & GYNECOLOGY

## 2017-05-31 PROCEDURE — 99213 OFFICE O/P EST LOW 20 MIN: CPT | Mod: TH,S$PBB,, | Performed by: OBSTETRICS & GYNECOLOGY

## 2017-05-31 PROCEDURE — 99212 OFFICE O/P EST SF 10 MIN: CPT | Mod: PBBFAC | Performed by: OBSTETRICS & GYNECOLOGY

## 2017-06-01 ENCOUNTER — HOSPITAL ENCOUNTER (OUTPATIENT)
Facility: HOSPITAL | Age: 27
Discharge: HOME | End: 2017-06-01
Attending: OBSTETRICS & GYNECOLOGY | Admitting: OBSTETRICS & GYNECOLOGY
Payer: MEDICAID

## 2017-06-01 VITALS
HEART RATE: 92 BPM | DIASTOLIC BLOOD PRESSURE: 66 MMHG | HEIGHT: 66 IN | TEMPERATURE: 99 F | SYSTOLIC BLOOD PRESSURE: 127 MMHG | BODY MASS INDEX: 38.25 KG/M2 | WEIGHT: 238 LBS | RESPIRATION RATE: 18 BRPM

## 2017-06-01 DIAGNOSIS — O47.9 IRREGULAR CONTRACTIONS: ICD-10-CM

## 2017-06-01 DIAGNOSIS — O47.9 IRREGULAR UTERINE CONTRACTIONS: Primary | ICD-10-CM

## 2017-06-01 PROCEDURE — 59025 FETAL NON-STRESS TEST: CPT | Mod: 26,,, | Performed by: ADVANCED PRACTICE MIDWIFE

## 2017-06-01 PROCEDURE — 99213 OFFICE O/P EST LOW 20 MIN: CPT | Mod: 25,TH,, | Performed by: ADVANCED PRACTICE MIDWIFE

## 2017-06-01 PROCEDURE — 99211 OFF/OP EST MAY X REQ PHY/QHP: CPT | Mod: 25

## 2017-06-01 PROCEDURE — G0378 HOSPITAL OBSERVATION PER HR: HCPCS

## 2017-06-01 PROCEDURE — 59025 FETAL NON-STRESS TEST: CPT

## 2017-06-01 RX ORDER — ONDANSETRON 8 MG/1
8 TABLET, ORALLY DISINTEGRATING ORAL EVERY 8 HOURS PRN
Status: DISCONTINUED | OUTPATIENT
Start: 2017-06-01 | End: 2017-06-01 | Stop reason: HOSPADM

## 2017-06-01 RX ORDER — ACETAMINOPHEN 500 MG
500 TABLET ORAL EVERY 6 HOURS PRN
Status: DISCONTINUED | OUTPATIENT
Start: 2017-06-01 | End: 2017-06-01 | Stop reason: HOSPADM

## 2017-06-01 NOTE — SUBJECTIVE & OBJECTIVE
Obstetric HPI:  Patient reports Date/time of onset: 17 at 0100 contractions, active fetal movement, Yes vaginal bleeding , Yes loss of fluid     This pregnancy has been complicated by tobacco use    Obstetric History       T3      L3     SAB0   TAB0   Ectopic0   Multiple0   Live Births3       # Outcome Date GA Lbr Philippe/2nd Weight Sex Delivery Anes PTL Lv   4 Current            3 Term 14 39w1d 06:19 / 00:18 3.487 kg (7 lb 11 oz) M Vag-Spont EPI N RODOLFO      Name: LARA,BABY BOY      Apgar1:  8                Apgar5: 9   2 Term 09 40w0d  3.374 kg (7 lb 7 oz) F Vag-Spont EPI N RODOLFO      Name: Lacy   1 Term 08 39w0d  3.118 kg (6 lb 14 oz) F Vag-Spont EPI N RODOLFO      Name: Helena        Past Medical History:   Diagnosis Date    First degree perineal laceration during delivery 2014     No past surgical history on file.    PTA Medications   Medication Sig    pantoprazole (PROTONIX) 20 MG tablet Take 1 tablet (20 mg total) by mouth once daily.       Review of patient's allergies indicates:  No Known Allergies     Family History     Problem Relation (Age of Onset)    No Known Problems Father, Mother        Social History Main Topics    Smoking status: Current Every Day Smoker     Packs/day: 0.50     Types: Cigarettes    Smokeless tobacco: Never Used    Alcohol use No    Drug use: No    Sexual activity: Yes     Partners: Male     Birth control/ protection: None     Review of Systems   Constitutional: Negative.    HENT: Negative.    Eyes: Negative.    Respiratory: Negative.    Cardiovascular: Negative.    Gastrointestinal: Negative.    Endocrine: Negative.    Genitourinary: Negative.    Musculoskeletal: Negative.    Skin:  Negative.   Neurological: Negative.    Hematological: Negative.    Psychiatric/Behavioral: Negative.    Breast: negative.    All other systems reviewed and are negative.     Objective:     Vital Signs (Most Recent):  Temp: 98.6 °F (37 °C) (17 0225)  Pulse: 92  (06/01/17 0225)  Resp: 18 (06/01/17 0225)  BP: 127/66 (06/01/17 0225) Vital Signs (24h Range):  Temp:  [98.6 °F (37 °C)] 98.6 °F (37 °C)  Pulse:  [92] 92  Resp:  [18] 18  BP: (108-127)/(66-68) 127/66     Weight: 108 kg (238 lb)  Body mass index is 38.41 kg/m².    FHT: 144Cat 1 (reassuring)  TOCO:  Q 10 minutes    Physical Exam:   Constitutional: She is oriented to person, place, and time. She appears well-developed and well-nourished.     Eyes: Conjunctivae are normal. Pupils are equal, round, and reactive to light.    Neck: Normal range of motion.    Cardiovascular: Normal rate and regular rhythm.     Pulmonary/Chest: Breath sounds normal.        Abdominal: Soft.     Genitourinary: Vagina normal and uterus normal.           Musculoskeletal: Normal range of motion and moves all extremeties.       Neurological: She is alert and oriented to person, place, and time.    Skin: Skin is warm.    Psychiatric: She has a normal mood and affect. Her behavior is normal. Thought content normal.       Cervix:  Dilation:  4  Effacement:  75%  Station: -1  Presentation: Vertex     Significant Labs:  Lab Results   Component Value Date    GROUPTRH A POS 11/07/2016    HEPBSAG Negative 11/07/2016    STREPBCULT No Group B Streptococcus isolated 05/18/2017       I have personallly reviewed all pertinent lab results from the last 24 hours.

## 2017-06-01 NOTE — DISCHARGE SUMMARY
[facility]  Obstetrics  Discharge Summary      Patient Name: Yaa Richmond  MRN: [id number]  Admission Date: 6/1/2017  Hospital Length of Stay: 0 days  Discharge Date and Time:  06/01/2017 3:18 AM  Attending Physician: Lizzy Morrison MD   Discharging Provider: Bella Sawant CNM  Primary Care Provider: Primary Doctor No    HPI: Irregular contractions following intercourse    * No surgery found *     Hospital Course:   Contractions following intercourse        Final Active Diagnoses:    Diagnosis Date Noted POA    Irregular uterine contractions [O62.2] 06/01/2017 Unknown      Problems Resolved During this Admission:    Diagnosis Date Noted Date Resolved POA    Irregular contractions [O62.2] 06/01/2017 06/01/2017 Yes            Feeding Method: bottle    Immunizations     None          This patient has no babies on file.  Pending Diagnostic Studies:     None          Discharged Condition: good    Disposition: Home or Self Care    Follow Up:  Follow-up Information     Follow up In 1 week.               Patient Instructions:     Diet general     Activity as tolerated       Medications:  Current Discharge Medication List      CONTINUE these medications which have NOT CHANGED    Details   pantoprazole (PROTONIX) 20 MG tablet Take 1 tablet (20 mg total) by mouth once daily.  Qty: 30 tablet, Refills: 1             Bella Sawant CNM  Obstetrics  [facility]

## 2017-06-01 NOTE — NURSING
Pt presents to Triage A with complaints of contractions every 3 min.  Pt states she had intercourse 2 hours ago and contractions started after.  VSS.  CNM notified.  Instructed to hydrate patient and get NST.   Call light in patients reach.  Will continue to monitor.

## 2017-06-07 ENCOUNTER — ROUTINE PRENATAL (OUTPATIENT)
Dept: OBSTETRICS AND GYNECOLOGY | Facility: CLINIC | Age: 27
End: 2017-06-07
Payer: MEDICAID

## 2017-06-07 VITALS
BODY MASS INDEX: 38.79 KG/M2 | DIASTOLIC BLOOD PRESSURE: 76 MMHG | WEIGHT: 240.31 LBS | SYSTOLIC BLOOD PRESSURE: 118 MMHG

## 2017-06-07 DIAGNOSIS — Z34.80 SUPERVISION OF OTHER NORMAL PREGNANCY: Primary | ICD-10-CM

## 2017-06-07 PROBLEM — O47.9 IRREGULAR UTERINE CONTRACTIONS: Status: RESOLVED | Noted: 2017-06-01 | Resolved: 2017-06-07

## 2017-06-07 PROCEDURE — 99999 PR PBB SHADOW E&M-EST. PATIENT-LVL II: CPT | Mod: PBBFAC,,, | Performed by: OBSTETRICS & GYNECOLOGY

## 2017-06-07 PROCEDURE — 99212 OFFICE O/P EST SF 10 MIN: CPT | Mod: PBBFAC | Performed by: OBSTETRICS & GYNECOLOGY

## 2017-06-07 PROCEDURE — 99213 OFFICE O/P EST LOW 20 MIN: CPT | Mod: TH,S$PBB,, | Performed by: OBSTETRICS & GYNECOLOGY

## 2017-06-09 ENCOUNTER — HOSPITAL ENCOUNTER (INPATIENT)
Facility: HOSPITAL | Age: 27
LOS: 2 days | Discharge: HOME | End: 2017-06-11
Attending: OBSTETRICS & GYNECOLOGY | Admitting: OBSTETRICS & GYNECOLOGY
Payer: MEDICAID

## 2017-06-09 ENCOUNTER — ANESTHESIA EVENT (OUTPATIENT)
Dept: OBSTETRICS AND GYNECOLOGY | Facility: HOSPITAL | Age: 27
End: 2017-06-09
Payer: MEDICAID

## 2017-06-09 ENCOUNTER — ANESTHESIA (OUTPATIENT)
Dept: OBSTETRICS AND GYNECOLOGY | Facility: HOSPITAL | Age: 27
End: 2017-06-09
Payer: MEDICAID

## 2017-06-09 DIAGNOSIS — O42.90 AMNIOTIC FLUID LEAKING: ICD-10-CM

## 2017-06-09 PROBLEM — O47.9 IRREGULAR UTERINE CONTRACTIONS: Status: RESOLVED | Noted: 2017-06-01 | Resolved: 2017-06-09

## 2017-06-09 LAB
ABO + RH BLD: NORMAL
BASOPHILS # BLD AUTO: 0.02 K/UL
BASOPHILS NFR BLD: 0.2 %
BLD GP AB SCN CELLS X3 SERPL QL: NORMAL
DIFFERENTIAL METHOD: ABNORMAL
EOSINOPHIL # BLD AUTO: 0.1 K/UL
EOSINOPHIL NFR BLD: 0.5 %
ERYTHROCYTE [DISTWIDTH] IN BLOOD BY AUTOMATED COUNT: 14.1 %
HCT VFR BLD AUTO: 35.4 %
HGB BLD-MCNC: 11.8 G/DL
LYMPHOCYTES # BLD AUTO: 3.2 K/UL
LYMPHOCYTES NFR BLD: 31.6 %
MCH RBC QN AUTO: 29.4 PG
MCHC RBC AUTO-ENTMCNC: 33.3 %
MCV RBC AUTO: 88 FL
MONOCYTES # BLD AUTO: 0.6 K/UL
MONOCYTES NFR BLD: 6.1 %
NEUTROPHILS # BLD AUTO: 6.3 K/UL
NEUTROPHILS NFR BLD: 61.6 %
PLATELET # BLD AUTO: 200 K/UL
PMV BLD AUTO: 10.8 FL
RBC # BLD AUTO: 4.02 M/UL
WBC # BLD AUTO: 10.25 K/UL

## 2017-06-09 PROCEDURE — 62326 NJX INTERLAMINAR LMBR/SAC: CPT | Performed by: NURSE ANESTHETIST, CERTIFIED REGISTERED

## 2017-06-09 PROCEDURE — 86901 BLOOD TYPING SEROLOGIC RH(D): CPT

## 2017-06-09 PROCEDURE — 86900 BLOOD TYPING SEROLOGIC ABO: CPT

## 2017-06-09 PROCEDURE — 72100002 HC LABOR CARE, 1ST 8 HOURS

## 2017-06-09 PROCEDURE — 51701 INSERT BLADDER CATHETER: CPT

## 2017-06-09 PROCEDURE — 25000003 PHARM REV CODE 250: Performed by: OBSTETRICS & GYNECOLOGY

## 2017-06-09 PROCEDURE — 59409 OBSTETRICAL CARE: CPT | Mod: AT,,, | Performed by: OBSTETRICS & GYNECOLOGY

## 2017-06-09 PROCEDURE — 63600175 PHARM REV CODE 636 W HCPCS: Performed by: NURSE ANESTHETIST, CERTIFIED REGISTERED

## 2017-06-09 PROCEDURE — 11000001 HC ACUTE MED/SURG PRIVATE ROOM

## 2017-06-09 PROCEDURE — 27800517 HC TRAY,EPIDURAL-CONTINUOUS: Performed by: NURSE ANESTHETIST, CERTIFIED REGISTERED

## 2017-06-09 PROCEDURE — 72200005 HC VAGINAL DELIVERY LEVEL II

## 2017-06-09 PROCEDURE — 85025 COMPLETE CBC W/AUTO DIFF WBC: CPT

## 2017-06-09 RX ORDER — DOCUSATE SODIUM 100 MG/1
100 CAPSULE, LIQUID FILLED ORAL DAILY
Status: DISCONTINUED | OUTPATIENT
Start: 2017-06-09 | End: 2017-06-11 | Stop reason: HOSPADM

## 2017-06-09 RX ORDER — HYDROCORTISONE 25 MG/G
CREAM TOPICAL 3 TIMES DAILY PRN
Status: DISCONTINUED | OUTPATIENT
Start: 2017-06-09 | End: 2017-06-11 | Stop reason: HOSPADM

## 2017-06-09 RX ORDER — MISOPROSTOL 200 UG/1
800 TABLET ORAL
Status: DISCONTINUED | OUTPATIENT
Start: 2017-06-09 | End: 2017-06-11 | Stop reason: HOSPADM

## 2017-06-09 RX ORDER — SODIUM CHLORIDE, SODIUM LACTATE, POTASSIUM CHLORIDE, CALCIUM CHLORIDE 600; 310; 30; 20 MG/100ML; MG/100ML; MG/100ML; MG/100ML
INJECTION, SOLUTION INTRAVENOUS CONTINUOUS
Status: DISCONTINUED | OUTPATIENT
Start: 2017-06-09 | End: 2017-06-09

## 2017-06-09 RX ORDER — IBUPROFEN 400 MG/1
800 TABLET ORAL EVERY 8 HOURS
Status: DISCONTINUED | OUTPATIENT
Start: 2017-06-10 | End: 2017-06-09

## 2017-06-09 RX ORDER — ONDANSETRON 8 MG/1
8 TABLET, ORALLY DISINTEGRATING ORAL EVERY 8 HOURS PRN
Status: DISCONTINUED | OUTPATIENT
Start: 2017-06-09 | End: 2017-06-11 | Stop reason: HOSPADM

## 2017-06-09 RX ORDER — OXYCODONE AND ACETAMINOPHEN 5; 325 MG/1; MG/1
1 TABLET ORAL EVERY 4 HOURS PRN
Status: DISCONTINUED | OUTPATIENT
Start: 2017-06-09 | End: 2017-06-11 | Stop reason: HOSPADM

## 2017-06-09 RX ORDER — IBUPROFEN 600 MG/1
600 TABLET ORAL EVERY 6 HOURS
Status: DISCONTINUED | OUTPATIENT
Start: 2017-06-09 | End: 2017-06-11 | Stop reason: HOSPADM

## 2017-06-09 RX ORDER — OXYCODONE AND ACETAMINOPHEN 10; 325 MG/1; MG/1
1 TABLET ORAL EVERY 4 HOURS PRN
Status: DISCONTINUED | OUTPATIENT
Start: 2017-06-09 | End: 2017-06-11 | Stop reason: HOSPADM

## 2017-06-09 RX ORDER — ACETAMINOPHEN 325 MG/1
650 TABLET ORAL EVERY 6 HOURS PRN
Status: DISCONTINUED | OUTPATIENT
Start: 2017-06-09 | End: 2017-06-11 | Stop reason: HOSPADM

## 2017-06-09 RX ORDER — OXYTOCIN/RINGER'S LACTATE 20/1000 ML
2 PLASTIC BAG, INJECTION (ML) INTRAVENOUS CONTINUOUS
Status: DISCONTINUED | OUTPATIENT
Start: 2017-06-09 | End: 2017-06-09

## 2017-06-09 RX ORDER — AMMONIA 15 % (W/V)
0.3 AMPUL (EA) INHALATION CONTINUOUS PRN
Status: DISCONTINUED | OUTPATIENT
Start: 2017-06-09 | End: 2017-06-11 | Stop reason: HOSPADM

## 2017-06-09 RX ORDER — OXYTOCIN/RINGER'S LACTATE 20/1000 ML
20 PLASTIC BAG, INJECTION (ML) INTRAVENOUS ONCE
Status: DISCONTINUED | OUTPATIENT
Start: 2017-06-09 | End: 2017-06-11 | Stop reason: HOSPADM

## 2017-06-09 RX ORDER — DIPHENHYDRAMINE HCL 25 MG
25 CAPSULE ORAL EVERY 4 HOURS PRN
Status: DISCONTINUED | OUTPATIENT
Start: 2017-06-09 | End: 2017-06-11 | Stop reason: HOSPADM

## 2017-06-09 RX ORDER — ROPIVACAINE HYDROCHLORIDE 2 MG/ML
INJECTION, SOLUTION EPIDURAL; INFILTRATION; PERINEURAL
Status: DISCONTINUED | OUTPATIENT
Start: 2017-06-09 | End: 2017-06-09

## 2017-06-09 RX ORDER — PROMETHAZINE HYDROCHLORIDE 25 MG/1
25 TABLET ORAL EVERY 6 HOURS PRN
Status: DISCONTINUED | OUTPATIENT
Start: 2017-06-09 | End: 2017-06-11 | Stop reason: HOSPADM

## 2017-06-09 RX ORDER — ROPIVACAINE IN 0.9% SOD CHL/PF 0.2 %
PLASTIC BAG, INJECTION (ML) EPIDURAL CONTINUOUS
Status: CANCELLED | OUTPATIENT
Start: 2017-06-09

## 2017-06-09 RX ORDER — ONDANSETRON 8 MG/1
8 TABLET, ORALLY DISINTEGRATING ORAL EVERY 8 HOURS PRN
Status: DISCONTINUED | OUTPATIENT
Start: 2017-06-09 | End: 2017-06-09

## 2017-06-09 RX ORDER — KETOROLAC TROMETHAMINE 30 MG/ML
30 INJECTION, SOLUTION INTRAMUSCULAR; INTRAVENOUS EVERY 6 HOURS
Status: DISCONTINUED | OUTPATIENT
Start: 2017-06-09 | End: 2017-06-09

## 2017-06-09 RX ADMIN — ROPIVACAINE HYDROCHLORIDE 3 ML: 2 INJECTION, SOLUTION EPIDURAL; INFILTRATION at 12:06

## 2017-06-09 RX ADMIN — IBUPROFEN 600 MG: 600 TABLET, FILM COATED ORAL at 11:06

## 2017-06-09 RX ADMIN — Medication 2 MILLI-UNITS/MIN: at 12:06

## 2017-06-09 RX ADMIN — SODIUM CHLORIDE, SODIUM LACTATE, POTASSIUM CHLORIDE, AND CALCIUM CHLORIDE: .6; .31; .03; .02 INJECTION, SOLUTION INTRAVENOUS at 11:06

## 2017-06-09 RX ADMIN — IBUPROFEN 600 MG: 600 TABLET, FILM COATED ORAL at 06:06

## 2017-06-09 RX ADMIN — Medication 333 MILLI-UNITS/MIN: at 03:06

## 2017-06-09 RX ADMIN — SODIUM CHLORIDE, SODIUM LACTATE, POTASSIUM CHLORIDE, AND CALCIUM CHLORIDE 1000 ML: .6; .31; .03; .02 INJECTION, SOLUTION INTRAVENOUS at 11:06

## 2017-06-09 RX ADMIN — DOCUSATE SODIUM 100 MG: 100 CAPSULE, LIQUID FILLED ORAL at 06:06

## 2017-06-09 NOTE — NURSING
"Discussed feeding choice with mother.  Reviewed benefits of breastfeeding.  Patient given "What to Expect in the First 48 Hours" handout. Mother states her intention is breastfeeding.    Coffective counseling sheet Fall in Love discussed with mother. Reinforced immediate skin to skin, the magic first hour, importance of the first feeding and delaying routine procedures. Encouraged mother to download Coffective mobile jessica if she has not already done so. Mother verbalies understanding.      Consents signed, questions answered   "

## 2017-06-09 NOTE — ANESTHESIA POSTPROCEDURE EVALUATION
"Anesthesia Post Evaluation    Patient: Yaa Richmond    Procedure(s) Performed: * No procedures listed *    Final Anesthesia Type: epidural  Patient location during evaluation: labor & delivery  Patient participation: Yes- Able to Participate  Level of consciousness: awake and alert  Post-procedure vital signs: reviewed and stable  Pain management: adequate  Airway patency: patent  PONV status at discharge: No PONV  Anesthetic complications: no      Cardiovascular status: blood pressure returned to baseline  Respiratory status: unassisted  Hydration status: euvolemic  Follow-up not needed.        Visit Vitals  Temp 36.8 °C (98.2 °F) (Oral)   Ht 5' 6" (1.676 m)   Wt 108.9 kg (240 lb)   Breastfeeding? No   BMI 38.74 kg/m²       Pain/Conor Score: No Data Recorded      "

## 2017-06-09 NOTE — L&D DELIVERY NOTE
of viable male infant over intact perineum. Anterior shoulder delivered with gentle traction. Mouth and nose suctioned with bulb syringe. Infant to mother's abdomen. Delayed cord clamping for approx 1 min. Active management of third stage performed. Placenta delivered via Mukherjee. Fundus firm and bleeding normal.  ml.  Inspection of perineum reveals no lacerations. Mother and infant stable and remain skin to skin. Apgars 8/9.     Delivery Information for  Sami Richmond    Birth information:  YOB: 2016   Time of birth: 2:59 PM   Sex: male   Head Delivery Date/Time: 2017  2:59 PM   Delivery type: Vaginal, Spontaneous Delivery   Gestational Age: 38w6d    Delivery Providers    Delivering clinician:  Rossi Gayle CNM   Other personnel:   Provider Role   Nelida Cui, INES Registered Nurse   Deb Jones, RN Registered Nurse   ST Sumeet Technician                   Measurements    Weight:  3710 g            Assessment    Living status:  Living   Apgars:      1 Minute:   5 Minute:   10 Minute:   15 Minute:   20 Minute:     Skin Color:   0  1       Heart Rate:   2  2       Reflex Irritability:   2  2       Muscle Tone:   2  2       Respiratory Effort:   2  2       Total:   8  9                  Apgars Assigned By:  MAHIN BRITTON          Assisted Delivery Details:    Forceps attempted?:  No   Vacuum extractor attempted?:  No             Shoulder Dystocia    Shoulder dystocia present?:  No            Presentation and Position    Presentation:   Vertex   Position:   Right    Occiput    Anterior               Interventions/Resuscitation    Method:  Bulb Suctioning, Tactile Stimulation        Cord    Vessels:  3 vessels   Complications:  None   Delayed Cord Clamping?:  Yes   Cord Clamped Date/Time:  2017  3:00 PM   Cord Blood Disposition:  Lab   Gases Sent?:  No   Stem Cell Collection (by MD):  No        Placenta    Date and time:  2017  3:04 PM    Removal:  Spontaneous   Appearance:  Intact   Placenta disposition:  discarded            Labor Events:       labor: No     Labor Onset Date/Time:         Dilation Complete Date/Time: 2017 14:58     Start Pushing Date/Time: 2017 14:58     Rupture Date/Time: 17  0800         Rupture type: spontaneous rupture of membranes         Fluid Amount:        Fluid Color: clear      Fluid Odor:        Membrane Status (PeriCalm):        Rupture Date/Time (PeriCalm):        Fluid Amount (PeriCalm):        Fluid Color (PeriCalm):         steroids: None     Antibiotics given for GBS: No     Induction: none     Indications for induction:        Augmentation: oxytocin     Indications for augmentation:       Labor complications: None     Additional complications:          Cervical ripening:                     Delivery:      Episiotomy: None     Indication for Episiotomy:       Perineal Lacerations: None Repaired:      Periurethral Laceration: none Repaired:     Labial Laceration: none Repaired:     Sulcus Laceration: none Repaired:     Vaginal Laceration: No Repaired:     Cervical Laceration: No Repaired:     Repair suture:       Repair # of packets:       Vaginal delivery QBL (mL):        QBL (mL): 0     Combined Blood Loss (mL): 0     Vaginal Sweep Performed:       Surgicount Correct:         Other providers:       Anesthesia    Method:  Epidural              Details (if applicable):  Trial of Labor      Categorization:      Priority:     Indications for :     Incision Type:       Additional  information:  Forceps:    Vacuum:    Breech:    Observed anomalies    Other (Comments):

## 2017-06-09 NOTE — ASSESSMENT & PLAN NOTE
Will monitor expectantly for 1-2 hours, if no regular contractions then will start pitocin per protocol

## 2017-06-09 NOTE — H&P
[facility]  Obstetrics  History & Physical    Patient Name: Yaa Richmond  MRN: [id number]  Admission Date: 2017  Primary Care Provider: Primary Doctor No    Subjective:     Principal Problem:<principal problem not specified>    History of Present Illness:  Irregular contractions following intercourse    No new subjective & objective note has been filed under this hospital service since the last note was generated.    Assessment/Plan:     26 y.o. female  at 38w6d for:    Irregular uterine contractions    A uterine contractions following intercourse  IUP at 37w5d  P discharge home  Reactive NST            Bella Sawant, DIONTE  Obstetrics  [facility]

## 2017-06-09 NOTE — ANESTHESIA PREPROCEDURE EVALUATION
06/09/2017  Yaa Richmond is a 26 y.o., female.    Anesthesia Evaluation    I have reviewed the Patient Summary Reports.    I have reviewed the Nursing Notes.   I have reviewed the Medications.     Review of Systems  Anesthesia Hx:  No problems with previous Anesthesia  History of prior surgery of interest to airway management or planning: Previous anesthesia: MAC  wisdom teeth with MAC.  Denies Family Hx of Anesthesia complications.   Denies Personal Hx of Anesthesia complications.   Social:  Smoker    Hematology/Oncology:  Hematology Normal   Oncology Normal     EENT/Dental:EENT/Dental Normal   Cardiovascular:  Cardiovascular Normal     Pulmonary:  Pulmonary Normal    Renal/:  Renal/ Normal     Hepatic/GI:  Hepatic/GI Normal    Musculoskeletal:  Musculoskeletal Normal    Neurological:  Neurology Normal        Physical Exam  General:  Well nourished    Airway/Jaw/Neck:  Airway Findings: Mouth Opening: Normal General Airway Assessment: Adult  Mallampati: II  Improves to II with phonation.  TM Distance: Normal, at least 6 cm      Dental:  Dental Findings: In tact        Mental Status:  Mental Status Findings:  Cooperative         Anesthesia Plan  Type of Anesthesia, risks & benefits discussed:  Anesthesia Type:  epidural  Patient's Preference:   Intra-op Monitoring Plan:   Intra-op Monitoring Plan Comments:   Post Op Pain Control Plan:   Post Op Pain Control Plan Comments:   Induction:    Beta Blocker:  Patient is not currently on a Beta-Blocker (No further documentation required).       Informed Consent: Patient understands risks and agrees with Anesthesia plan.  Questions answered. Anesthesia consent signed with patient.  ASA Score: 2     Day of Surgery Review of History & Physical: I have interviewed and examined the patient. I have reviewed the patient's H&P dated:  There are no significant  changes.          Ready For Surgery From Anesthesia Perspective.

## 2017-06-09 NOTE — ANESTHESIA RELEASE NOTE
"Anesthesia Release from PACU Note    Patient: Yaa Richmond    Procedure(s) Performed: * No procedures listed *    Anesthesia type: epidural    Post pain: Adequate analgesia    Post assessment: no apparent anesthetic complications    Last Vitals:   Visit Vitals  Temp 36.8 °C (98.2 °F) (Oral)   Ht 5' 6" (1.676 m)   Wt 108.9 kg (240 lb)   Breastfeeding? No   BMI 38.74 kg/m²       Post vital signs: stable    Level of consciousness: awake    Nausea/Vomiting: no nausea/no vomiting    Complications: none    Airway Patency: patent    Respiratory: unassisted    Cardiovascular: stable and blood pressure at baseline    Hydration: euvolemic  "

## 2017-06-09 NOTE — ANESTHESIA PROCEDURE NOTES
Epidural    Patient location during procedure: OB   Reason for block: primary anesthetic   Start time: 6/9/2017 11:53 AM  Timeout: 6/9/2017 11:53 AM  Staffing  Anesthesiologist: AZIZA BARRERA  Resident/CRNA: GARRISON LESTER  Performed: resident/CRNA   Preanesthetic Checklist  Completed: patient identified, surgical consent, pre-op evaluation, timeout performed, IV checked, risks and benefits discussed, monitors and equipment checked, anesthesia consent given, hand hygiene performed and patient being monitored  Preparation  Patient position: sitting  Prep: Betadine  Patient monitoring: Pulse Ox and Blood Pressure  Epidural  Skin Anesthetic: lidocaine 1%  Skin Wheal: 3 mL  Administration type: continuous  Approach: midline  Interspace: L4-5  Injection technique: ELIZABETH air  Needle and Epidural Catheter  Needle type: Tuohy   Needle gauge: 18  Needle length: 3.5 inches  Needle insertion depth: 7 cm  Catheter type: multi-orifice  Catheter size: 20 G  Catheter at skin depth: 11 cm  Additional Documentation: incremental injection, negative aspiration for heme and CSF, no signs/symptoms of IV or SA injection, no significant pain on injection, no paresthesia on injection and no significant complaints from patient  Needle localization: anatomical landmarks  Medications:  Bolus administered: 11 mL of 0.2% ropivacaine  Epinephrine added: none  Assessment  Upper dermatomal levels - Left: T10  Right: T10   Dermatomal levels determined by alcohol wipe  Ease of block: easy  Patient's tolerance of the procedure: comfortable throughout block and no complaints

## 2017-06-09 NOTE — TRANSFER OF CARE
"Anesthesia Transfer of Care Note    Patient: Yaa Richmond    Procedure(s) Performed: * No procedures listed *    Patient location: Labor and Delivery    Anesthesia Type: epidural    Transport from OR: Transported from OR on room air with adequate spontaneous ventilation    Post pain: adequate analgesia    Post assessment: no apparent anesthetic complications    Post vital signs: stable    Level of consciousness: awake, alert and oriented    Nausea/Vomiting: no nausea/vomiting    Complications: none    Transfer of care protocol was followed      Last vitals:   Visit Vitals  Temp 36.8 °C (98.2 °F) (Oral)   Ht 5' 6" (1.676 m)   Wt 108.9 kg (240 lb)   Breastfeeding? No   BMI 38.74 kg/m²     "

## 2017-06-09 NOTE — ANESTHESIA RELEASE NOTE
"Anesthesia Release from PACU Note    Patient: Yaa Richmond    Procedure(s) Performed: * No procedures listed *    Anesthesia type: epidural    Post pain: Adequate analgesia    Post assessment: no apparent anesthetic complications, tolerated procedure well and no evidence of recall    Last Vitals:   Visit Vitals  Temp 36.8 °C (98.2 °F) (Oral)   Ht 5' 6" (1.676 m)   Wt 108.9 kg (240 lb)   Breastfeeding? No   BMI 38.74 kg/m²       Post vital signs: stable    Level of consciousness: awake    Nausea/Vomiting: no nausea/no vomiting    Complications: none    Airway Patency: patent    Respiratory: unassisted    Cardiovascular: stable and blood pressure at baseline    Hydration: euvolemic  "

## 2017-06-09 NOTE — PROGRESS NOTES
"S: Comfortable with epidural, feeling intermittent pressure  O:  VS reviewed, afebrile   Vitals:    06/09/17 1041   Temp: 98.2 °F (36.8 °C)   TempSrc: Oral   Weight: 108.9 kg (240 lb)   Height: 5' 6" (1.676 m)     FHTs: Category 1  UC: every 2-3 minutes  SVE: 7/100/-3  Pitocin @ 4 mu/min    A: IUP @ 38w6d; labor    Patient Active Problem List   Diagnosis    Supervision of other normal pregnancy    Tobacco smoking affecting pregnancy in first trimester    Encounter for lactation counseling    Amniotic fluid leaking     P: Anticipate vaginal delivery        "

## 2017-06-09 NOTE — H&P
[facility] -   Obstetrics  History & Physical    Patient Name: Yaa Richmond  MRN: [id number]  Admission Date: 2017  Primary Care Provider: Primary Doctor No    Subjective:     Principal Problem: Amniotic fluid leaking    History of Present Illness:  C/o SROM 0915 clear; mild contractions; no bleeding    Obstetric HPI:  Patient reports mild contractions, active fetal movement, No vaginal bleeding , Yes loss of fluid     This pregnancy has been complicated by tobacco smoking    Obstetric History       T3      L3     SAB0   TAB0   Ectopic0   Multiple0   Live Births3       # Outcome Date GA Lbr Philippe/2nd Weight Sex Delivery Anes PTL Lv   4 Current            3 Term 14 39w1d 06:19 / 00:18 3.487 kg (7 lb 11 oz) M Vag-Spont EPI N RODOLFO      Name: LARA,BABY BOY      Apgar1:  8                Apgar5: 9   2 Term 09 40w0d  3.374 kg (7 lb 7 oz) F Vag-Spont EPI N RDOOLFO      Name: Lacy   1 Term 08 39w0d  3.118 kg (6 lb 14 oz) F Vag-Spont EPI N RODOLFO      Name: Helena        Past Medical History:   Diagnosis Date    First degree perineal laceration during delivery 2014     No past surgical history on file.    PTA Medications   Medication Sig    pantoprazole (PROTONIX) 20 MG tablet Take 1 tablet (20 mg total) by mouth once daily.       Review of patient's allergies indicates:  No Known Allergies     Family History     Problem Relation (Age of Onset)    No Known Problems Father, Mother        Social History Main Topics    Smoking status: Current Every Day Smoker     Packs/day: 0.50     Types: Cigarettes    Smokeless tobacco: Never Used    Alcohol use No    Drug use: No    Sexual activity: Yes     Partners: Male     Birth control/ protection: None     Review of Systems   Constitutional: Negative for chills and fever.   Respiratory: Negative for cough and shortness of breath.    Cardiovascular: Negative for chest pain.   Gastrointestinal: Positive for abdominal pain (mild).  Negative for constipation, diarrhea, nausea and vomiting.   Genitourinary: Positive for vaginal discharge (leaking amniotic fluid). Negative for genital sores, vaginal bleeding and vaginal odor.   Neurological: Negative for seizures and headaches.      Objective:     FHT: Cat 1 (reassuring)  TOCO:  Irregular/mild    Physical Exam:   Constitutional: She is oriented to person, place, and time. She appears well-developed and well-nourished. No distress.    HENT:   Head: Normocephalic and atraumatic.    Eyes: Right eye exhibits no discharge. Left eye exhibits no discharge.    Neck: Normal range of motion. Neck supple. No tracheal deviation present. No thyromegaly present.    Cardiovascular: Normal rate and regular rhythm.     Pulmonary/Chest: Effort normal and breath sounds normal. No respiratory distress.        Abdominal: Soft. There is no tenderness.   gravid             Musculoskeletal: Normal range of motion.       Neurological: She is alert and oriented to person, place, and time. She has normal reflexes. She exhibits normal muscle tone. Coordination normal.    Skin: Skin is warm and dry. No rash noted. She is not diaphoretic. No erythema. No pallor.    Psychiatric: She has a normal mood and affect. Her behavior is normal. Judgment and thought content normal.     Cervix:  Dilation:  5  Effacement:  75%  Station: -2  Presentation: Vertex     Significant Labs:  Lab Results   Component Value Date    GROUPTRH A POS 2016    HEPBSAG Negative 2016    STREPBCULT No Group B Streptococcus isolated 2017     I have personallly reviewed all pertinent lab results from the last 24 hours.    Assessment/Plan:     26 y.o. female  at 38w6d for:    * Amniotic fluid leaking    Will monitor expectantly for 1-2 hours, if no regular contractions then will start pitocin per protocol          Rossi Gayle CNM  Obstetrics  [facility] -

## 2017-06-09 NOTE — SUBJECTIVE & OBJECTIVE
Obstetric HPI:  Patient reports mild contractions, active fetal movement, No vaginal bleeding , Yes loss of fluid     This pregnancy has been complicated by tobacco smoking    Obstetric History       T3      L3     SAB0   TAB0   Ectopic0   Multiple0   Live Births3       # Outcome Date GA Lbr Philippe/2nd Weight Sex Delivery Anes PTL Lv   4 Current            3 Term 14 39w1d 06:19 / 00:18 3.487 kg (7 lb 11 oz) M Vag-Spont EPI N RODOLFO      Name: LARA,BABY BOY      Apgar1:  8                Apgar5: 9   2 Term 09 40w0d  3.374 kg (7 lb 7 oz) F Vag-Spont EPI N RODOLFO      Name: Lacy   1 Term 08 39w0d  3.118 kg (6 lb 14 oz) F Vag-Spont EPI N RODOLFO      Name: Helena        Past Medical History:   Diagnosis Date    First degree perineal laceration during delivery 2014     No past surgical history on file.    PTA Medications   Medication Sig    pantoprazole (PROTONIX) 20 MG tablet Take 1 tablet (20 mg total) by mouth once daily.       Review of patient's allergies indicates:  No Known Allergies     Family History     Problem Relation (Age of Onset)    No Known Problems Father, Mother        Social History Main Topics    Smoking status: Current Every Day Smoker     Packs/day: 0.50     Types: Cigarettes    Smokeless tobacco: Never Used    Alcohol use No    Drug use: No    Sexual activity: Yes     Partners: Male     Birth control/ protection: None     Review of Systems   Constitutional: Negative for chills and fever.   Respiratory: Negative for cough and shortness of breath.    Cardiovascular: Negative for chest pain.   Gastrointestinal: Positive for abdominal pain (mild). Negative for constipation, diarrhea, nausea and vomiting.   Genitourinary: Positive for vaginal discharge (leaking amniotic fluid). Negative for genital sores, vaginal bleeding and vaginal odor.   Neurological: Negative for seizures and headaches.      Objective:     FHT: Cat 1 (reassuring)  TOCO:  Irregular/mild    Physical  Exam:   Constitutional: She is oriented to person, place, and time. She appears well-developed and well-nourished. No distress.    HENT:   Head: Normocephalic and atraumatic.    Eyes: Right eye exhibits no discharge. Left eye exhibits no discharge.    Neck: Normal range of motion. Neck supple. No tracheal deviation present. No thyromegaly present.    Cardiovascular: Normal rate and regular rhythm.     Pulmonary/Chest: Effort normal and breath sounds normal. No respiratory distress.        Abdominal: Soft. There is no tenderness.   gravid             Musculoskeletal: Normal range of motion.       Neurological: She is alert and oriented to person, place, and time. She has normal reflexes. She exhibits normal muscle tone. Coordination normal.    Skin: Skin is warm and dry. No rash noted. She is not diaphoretic. No erythema. No pallor.    Psychiatric: She has a normal mood and affect. Her behavior is normal. Judgment and thought content normal.     Cervix:  Dilation:  5  Effacement:  75%  Station: -2  Presentation: Vertex     Significant Labs:  Lab Results   Component Value Date    GROUPTRH A POS 11/07/2016    HEPBSAG Negative 11/07/2016    STREPBCULT No Group B Streptococcus isolated 05/18/2017     I have personallly reviewed all pertinent lab results from the last 24 hours.

## 2017-06-10 PROCEDURE — 11000001 HC ACUTE MED/SURG PRIVATE ROOM

## 2017-06-10 PROCEDURE — 25000003 PHARM REV CODE 250: Performed by: OBSTETRICS & GYNECOLOGY

## 2017-06-10 RX ADMIN — OXYCODONE HYDROCHLORIDE AND ACETAMINOPHEN 1 TABLET: 10; 325 TABLET ORAL at 12:06

## 2017-06-10 RX ADMIN — IBUPROFEN 600 MG: 600 TABLET, FILM COATED ORAL at 06:06

## 2017-06-10 RX ADMIN — OXYCODONE HYDROCHLORIDE AND ACETAMINOPHEN 1 TABLET: 5; 325 TABLET ORAL at 07:06

## 2017-06-10 RX ADMIN — OXYCODONE HYDROCHLORIDE AND ACETAMINOPHEN 1 TABLET: 5; 325 TABLET ORAL at 08:06

## 2017-06-10 RX ADMIN — OXYCODONE HYDROCHLORIDE AND ACETAMINOPHEN 1 TABLET: 5; 325 TABLET ORAL at 03:06

## 2017-06-10 RX ADMIN — DOCUSATE SODIUM 100 MG: 100 CAPSULE, LIQUID FILLED ORAL at 08:06

## 2017-06-10 RX ADMIN — IBUPROFEN 600 MG: 600 TABLET, FILM COATED ORAL at 12:06

## 2017-06-10 NOTE — PROGRESS NOTES
[facility] -   Obstetrics  Postpartum Progress Note    Patient Name: Yaa Richmond  MRN: [id number]  Admission Date: 2017  Hospital Length of Stay: 1 days  Attending Physician: Debra Renee, *  Primary Care Provider: Primary Doctor No    Subjective:     Principal Problem: (normal spontaneous vaginal delivery)    Hospital course: SVE: /-3; + clear fluid  Admit to inpatient    Patient is breastfeeding. Using postpartum BTL for contraception (at 6 weeks). She desires circumcision for her male baby: yes.    Objective:     Vital Signs (Most Recent):  Temp: 97.7 °F (36.5 °C) (06/10/17 0800)  Pulse: 72 (06/10/17 0800)  Resp: 18 (06/10/17 0800)  BP: (!) 99/55 (06/10/17 0800) Vital Signs (24h Range):  Temp:  [97.7 °F (36.5 °C)-99.2 °F (37.3 °C)] 97.7 °F (36.5 °C)  Pulse:  [72-99] 72  Resp:  [18-20] 18  BP: ()/(42-69) 99/55     Weight: 108.9 kg (240 lb)  Body mass index is 38.74 kg/m².      Intake/Output Summary (Last 24 hours) at 06/10/17 0919  Last data filed at 17 2350   Gross per 24 hour   Intake          2764.13 ml   Output              700 ml   Net          2064.13 ml       Significant Labs:  Lab Results   Component Value Date    GROUPTRH A POS 2017    HEPBSAG Negative 2016    STREPBCULT No Group B Streptococcus isolated 2017       Recent Labs  Lab 17  1055   HGB 11.8*   HCT 35.4*       I have personallly reviewed all pertinent lab results from the last 24 hours.    Physical Exam:   Constitutional: She is oriented to person, place, and time. She appears well-developed and well-nourished. No distress.        Pulmonary/Chest: Effort normal.        Abdominal: Soft. There is no tenderness.   Fundus firm @ U-1                 Neurological: She is alert and oriented to person, place, and time.     Psychiatric: She has a normal mood and affect.       Assessment/Plan:     26 y.o. female  for:    *  (normal spontaneous vaginal delivery)     Postpartum care:   - Patient doing well. Continue routine management and advances.   - Continue PO pain meds. Pain well controlled.   - Encourage ambulation   - Circumcision: desires   - Contraception: BTL   - Lactation : consult prn              Disposition: As patient meets milestones, will plan to discharge tomorrow.    Raquel Rios CNM  Obstetrics  [facility] [place]

## 2017-06-10 NOTE — LACTATION NOTE
"Lactation Rounds: Lactation packet given and admit information reviewed. Mother verbalizes understanding of expected  behaviors and output for the first 48 hours of life.  Discussed the importance of cue based feedings on demand, unrestricted access to the breast, and frequent uninterrupted skin to skin contact.  Risk and implications of artificial nipples and supplementation discussed.  Encouraged mother to call for assistance when desired or when infant is showing signs of hunger, contact number provided, mother verbalizes understanding.  Infant exhibiting feeding cues. Assisted mother with positioning infant to right breast in football hold. Mother latched infant, shallow latch noted, mother reports pain. Mother correctly unlatched infant. Reviewed latch technique, mother able to return demonstrate. Infant obtained deep asymmetric latch, mother reports latch feels "much better". Infant fed with audible swallows. After a few minutes, infant adjusted latch, shallow latch noted. Nipple slightly compressed upon unlatching. Reviewed hand expression and nipple care, mother able to return demonstrate but unable to express colostrum.  Infant left sleeping skin to skin with mother, will call for assistance as needed.     06/10/17 0900   Maternal Infant Assessment   Breast Shape Bilateral:;round   Breast Density Bilateral:;soft   Areola Bilateral:;elastic   Nipple(s) Bilateral:;everted   Nipple Symptoms bilateral:;tender   Infant Assessment   Weight Loss (%) 2.3   Number of Stools (24 hours) 3   Number of Voids (24 hours) 4   LATCH Score   Latch 2-->grasps breast, tongue down, lips flanged, rhythmic sucking   Audible Swallowing 2-->spontaneous and intermittent (24 hrs old)   Type Of Nipple 2-->everted (after stimulation)   Comfort (Breast/Nipple) 1-->filling, red/small blisters/bruises, mild/mod discomfort   Hold (Positioning) 1-->minimal assist, teach one side: mother does other, staff holds   Score (less than 7 " "for 2/more consecutive times, consult Lactation Consultant) 8   Maternal Infant Feeding   Infant Positioning clutch/"football"   Signs of Milk Transfer audible swallow;infant jaw motion present   Presence of Pain no   Nipple Shape After Feeding, Right slight compression   Latch Assistance yes   Feeding Infant   Feeding Readiness Cues crying;rooting   Satiety Cues infant releases breast;sleeping after feeding   Effective Latch During Feeding yes   Audible Swallow yes   Lactation Interventions   Attachment Promotion breastfeeding assistance provided;counseling provided;skin-to-skin contact encouraged;rooming-in promoted;privacy provided;infant-mother separation minimized   Breastfeeding Assistance assisted with positioning;both breasts offered each feeding;feeding cue recognition promoted;feeding on demand promoted;feeding session observed;infant latch-on verified;support offered   Maternal Breastfeeding Support encouragement offered;lactation counseling provided   Latch Promotion positioning assisted     "

## 2017-06-10 NOTE — PLAN OF CARE
Problem: Patient Care Overview  Goal: Plan of Care Review  Outcome: Ongoing (interventions implemented as appropriate)  Pt progressing well. Vss. Bonding well with infant. Breastfeeding is improving after working with lactation and myself. Bleeding light, encouraged pt to void more often if possible. Pain controlled with po scheduled motrin and prn percocet. Family at bs.

## 2017-06-10 NOTE — PLAN OF CARE
Problem: Patient Care Overview  Goal: Plan of Care Review  Patient progressing well. Bonding appropriately with . Pain controlled with oral medications. Ambulates to restroom independently and voids without difficulty. Firm fundus. Light vaginal bleeding. One moderate sized blood clot noted around 2345. Patient instructed to notify nurse if she has any more clots. Vital signs stable. Will continue to monitor.

## 2017-06-10 NOTE — ASSESSMENT & PLAN NOTE
Postpartum care:   - Patient doing well. Continue routine management and advances.   - Continue PO pain meds. Pain well controlled.   - Encourage ambulation   - Circumcision: desires   - Contraception: BTL   - Lactation : consult prn

## 2017-06-10 NOTE — SUBJECTIVE & OBJECTIVE
Hospital course: SVE: 5/70/-3; + clear fluid  Admit to inpatient    Patient is breastfeeding. Using postpartum BTL for contraception (at 6 weeks). She desires circumcision for her male baby: yes.    Objective:     Vital Signs (Most Recent):  Temp: 97.7 °F (36.5 °C) (06/10/17 0800)  Pulse: 72 (06/10/17 0800)  Resp: 18 (06/10/17 0800)  BP: (!) 99/55 (06/10/17 0800) Vital Signs (24h Range):  Temp:  [97.7 °F (36.5 °C)-99.2 °F (37.3 °C)] 97.7 °F (36.5 °C)  Pulse:  [72-99] 72  Resp:  [18-20] 18  BP: ()/(42-69) 99/55     Weight: 108.9 kg (240 lb)  Body mass index is 38.74 kg/m².      Intake/Output Summary (Last 24 hours) at 06/10/17 0919  Last data filed at 06/09/17 2350   Gross per 24 hour   Intake          2764.13 ml   Output              700 ml   Net          2064.13 ml       Significant Labs:  Lab Results   Component Value Date    GROUPTRH A POS 06/09/2017    HEPBSAG Negative 11/07/2016    STREPBCULT No Group B Streptococcus isolated 05/18/2017       Recent Labs  Lab 06/09/17  1055   HGB 11.8*   HCT 35.4*       I have personallly reviewed all pertinent lab results from the last 24 hours.    Physical Exam:   Constitutional: She is oriented to person, place, and time. She appears well-developed and well-nourished. No distress.        Pulmonary/Chest: Effort normal.        Abdominal: Soft. There is no tenderness.   Fundus firm @ U-1                 Neurological: She is alert and oriented to person, place, and time.     Psychiatric: She has a normal mood and affect.

## 2017-06-11 VITALS
BODY MASS INDEX: 38.57 KG/M2 | DIASTOLIC BLOOD PRESSURE: 59 MMHG | HEIGHT: 66 IN | TEMPERATURE: 99 F | HEART RATE: 90 BPM | RESPIRATION RATE: 20 BRPM | WEIGHT: 240 LBS | SYSTOLIC BLOOD PRESSURE: 122 MMHG

## 2017-06-11 PROCEDURE — 99238 HOSP IP/OBS DSCHRG MGMT 30/<: CPT | Mod: ,,, | Performed by: MIDWIFE

## 2017-06-11 PROCEDURE — 25000003 PHARM REV CODE 250: Performed by: OBSTETRICS & GYNECOLOGY

## 2017-06-11 PROCEDURE — 90471 IMMUNIZATION ADMIN: CPT | Performed by: OBSTETRICS & GYNECOLOGY

## 2017-06-11 PROCEDURE — 63600175 PHARM REV CODE 636 W HCPCS: Performed by: OBSTETRICS & GYNECOLOGY

## 2017-06-11 PROCEDURE — 90715 TDAP VACCINE 7 YRS/> IM: CPT | Performed by: OBSTETRICS & GYNECOLOGY

## 2017-06-11 PROCEDURE — 3E0234Z INTRODUCTION OF SERUM, TOXOID AND VACCINE INTO MUSCLE, PERCUTANEOUS APPROACH: ICD-10-PCS | Performed by: OBSTETRICS & GYNECOLOGY

## 2017-06-11 RX ADMIN — IBUPROFEN 600 MG: 600 TABLET, FILM COATED ORAL at 12:06

## 2017-06-11 RX ADMIN — IBUPROFEN 600 MG: 600 TABLET, FILM COATED ORAL at 05:06

## 2017-06-11 RX ADMIN — DOCUSATE SODIUM 100 MG: 100 CAPSULE, LIQUID FILLED ORAL at 08:06

## 2017-06-11 RX ADMIN — OXYCODONE HYDROCHLORIDE AND ACETAMINOPHEN 1 TABLET: 5; 325 TABLET ORAL at 02:06

## 2017-06-11 RX ADMIN — CLOSTRIDIUM TETANI TOXOID ANTIGEN (FORMALDEHYDE INACTIVATED), CORYNEBACTERIUM DIPHTHERIAE TOXOID ANTIGEN (FORMALDEHYDE INACTIVATED), BORDETELLA PERTUSSIS TOXOID ANTIGEN (GLUTARALDEHYDE INACTIVATED), BORDETELLA PERTUSSIS FILAMENTOUS HEMAGGLUTININ ANTIGEN (FORMALDEHYDE INACTIVATED), BORDETELLA PERTUSSIS PERTACTIN ANTIGEN, AND BORDETELLA PERTUSSIS FIMBRIAE 2/3 ANTIGEN 0.5 ML: 5; 2; 2.5; 5; 3; 5 INJECTION, SUSPENSION INTRAMUSCULAR at 11:06

## 2017-06-11 NOTE — PLAN OF CARE
Problem: Patient Care Overview  Goal: Plan of Care Review  Outcome: Ongoing (interventions implemented as appropriate)  Pt progressing, bonding well with baby boy. VSS. Pain controlled with motrin and prn percocet. Breastfeeding is improving. Questions encouraged and answered. Will continue to monitor progress.

## 2017-06-11 NOTE — DISCHARGE SUMMARY
[facility] -   Obstetrics  Discharge Summary      Patient Name: Yaa Richmond  MRN: [id number]  Admission Date: 2017  Hospital Length of Stay: 2 days  Discharge Date and Time:  2017 8:44 AM  Attending Physician: Debra Renee, *   Discharging Provider: Jorge Ontiveros CNM  Primary Care Provider: Primary Doctor No    HPI: C/o SROM 0915 clear; mild contractions; no bleeding    * No surgery found *     Hospital Course:   SVE: /3; + clear fluid  Admit to inpatient    17--PPD #2. Normal PP course, d/c today        Final Active Diagnoses:    Diagnosis Date Noted POA    PRINCIPAL PROBLEM:   (normal spontaneous vaginal delivery) [O80] 2017 Not Applicable    Outcome of delivery, single liveborn [Z37.0] 2014 Not Applicable      Problems Resolved During this Admission:    Diagnosis Date Noted Date Resolved POA    Amniotic fluid leaking [O42.90] 2017 Yes            Feeding Method: breast    Immunizations     Date Immunization Status Dose Route/Site Given by    17 1609 MMR Incomplete 0.5 mL Subcutaneous/Left deltoid     17 1609 Tdap Incomplete 0.5 mL Intramuscular/Left deltoid           Delivery:    Episiotomy: None   Lacerations: None   Repair suture:     Repair # of packets:     Blood loss (ml): 100     Birth information:  YOB: 2016   Time of birth: 2:59 PM   Sex: male   Delivery type: Vaginal, Spontaneous Delivery   Gestational Age: 38w6d    Delivery Clinician:      Other providers:       Additional  information:  Forceps:    Vacuum:    Breech:    Observed anomalies      Living?:           APGARS  One minute Five minutes Ten minutes   Skin color:         Heart rate:         Grimace:         Muscle tone:         Breathing:         Totals: 8  9        Placenta: Delivered:       appearance    Pending Diagnostic Studies:     None          Discharged Condition: good    Disposition: Home    Follow Up:  Follow-up Information      Rossi E Fortune, CNM. Go on 7/12/2017.    Specialty:  Obstetrics and Gynecology  Why:  postpartum follow up  Contact information:  [address] [address] [phone]             Follow up In 2 weeks.    Why:  BTL consult w/ MD               Patient Instructions:   No discharge procedures on file.  Medications:  Current Discharge Medication List      STOP taking these medications       pantoprazole (PROTONIX) 20 MG tablet Comments:   Reason for Stopping:               Jorge Ontiveros CNM  Obstetrics  [facility]

## 2017-06-11 NOTE — LACTATION NOTE
"Lactation Rounds: Infant output WNL, wt loss 8.1%. Chart reflects 5 feedings in last 24 hours, mother reflects additional feedings not reflected in chart. Upon entering room, infant latched to left breast in football hold, adequate position and latch noted. Encouraged mother to perform breast massage and compression, audible swallows noted. Infant occasionally pulling away from breast, pulling nipple back into mouth with shallow latch noted. Mother declines latch adjustment. Nipple shape WNL upon unlatching. Infant fed with frequent unlatches and relatches. Mother politely dismissive of latch assistance at this time.   Lactation discharge information reviewed.  Mother is aware of warm line, and outpatient consultations and monthly support gatherings. Encouraged mother to contact lactation with any questions, concerns, or problems. Contact numbers provided, and mother verbalizes understanding.     06/11/17 0830   Infant Assessment   Weight Loss (%) 8.1   Sucking Reflex present   Rooting Reflex present   Swallow Reflex present   Number of Stools (24 hours) 6   Number of Voids (24 hours) 6   LATCH Score   Latch 2-->grasps breast, tongue down, lips flanged, rhythmic sucking   Audible Swallowing 2-->spontaneous and intermittent (24 hrs old)   Type Of Nipple 2-->everted (after stimulation)   Comfort (Breast/Nipple) 2-->soft/nontender   Hold (Positioning) 2-->no assist from staff, mother able to position/hold infant   Score (less than 7 for 2/more consecutive times, consult Lactation Consultant) 10   Maternal Infant Feeding   Infant Positioning clutch/"football"   Signs of Milk Transfer audible swallow;infant jaw motion present   Presence of Pain no   Nipple Shape After Feeding, Left WNL   Feeding Infant   Feeding Readiness Cues rooting;smacking   Effective Latch During Feeding yes   Audible Swallow yes   Lactation Interventions   Attachment Promotion counseling provided;skin-to-skin contact encouraged;rooming-in promoted "   Breastfeeding Assistance both breasts offered each feeding;feeding cue recognition promoted;feeding on demand promoted;feeding session observed;infant latch-on verified;support offered   Maternal Breastfeeding Support encouragement offered;lactation counseling provided

## 2017-06-11 NOTE — NURSING
Discharge instructions given and reviewed with pt. Questions answered at this time. Pt verbalized understanding. Vss.   1150- taken to car in wheelchair with infant on lap. No distress noted.

## 2017-06-11 NOTE — DISCHARGE INSTRUCTIONS
"Mother Self Care:    Activity: Avoid strenuous exercise and get adequate rest.  No driving until the physician consent given.  Emotional Changes: Most women find birth to be a time of great emotional upheaval.  Sense of loss, mood swings, fatigue, anxiety, and feeling "let down" are common.  If feelings worsen or last more than a week, call your physician.  Breast Care/Breastfeeding: Wear a bra for comfort.  Keep nipples dry and apply your own breast milk or lanolin cream as needed for soreness.  Engorgement can be relieved with warm, moist heat before feedings.  You may also take Ibuprofen.  Breast Care/Bottle Feeding: Wear support bra 24 hours a day for one week.  Avoid stimulation to breasts.  You may use ice packs for discomfort.  Cindy-Care/Vaginal Bleeding: Remember to use your cindy-bottle after urinating.  Your flow will change from red, to pink, to yellow/white color over a period of 2 weeks.  Menstruation will return in 3-8 weeks, or longer if breastfeeding.  Episiotomy Vaginal Delivery: Stitches will dissolve within 10 days to 3 weeks.  Warm baths, tucks, and dermoplast will promote healing.  Avoid bubble baths or strong soaps.   Section/Tubal Ligation: Keep incision clean and dry.  Please remove steri-strips in 5-7 days.  You may shower, but avoid baths.  Sexual Activity/Pelvic Rest: No sexual activity, tampons, or douching until your physician gives you consent.  Diet: Continue to eat from the five basic food groups, including plenty of protein, fruits, vegetables, and whole grains.  Limit empty calories and high fat foods.  Drink enough fluids to satisfy thirst and add an extra 500 calories for breastfeeding.  Constipation/Hemorrhoids: Drink plenty of water.  You may take a stool softener or natural laxative (Metamucil). You may use tucks or hemorrhoid ointment and soak in a warm tub.    CALL YOUR OB DOCTOR IF ANY OF THE FOLLOWING OCCURS:  *Heavy bleeding - saturating a pad an hour or passing any " large (2-3 inches in size) blood clots.  *Any pain, redness, or tenderness in lower leg.  *You cannot care for yourself or your baby.  *Any signs of infection-      - Temperature greater than 100.5 degrees F      - Foul smelling vaginal discharge and/or incisional drainage      - Increased episiotomy or incisional pain      - Hot, hard, red or sore area on breast      - Flu-like symptoms      - Any urgency, frequency or burning with urination

## 2017-06-14 ENCOUNTER — TELEPHONE (OUTPATIENT)
Dept: OBSTETRICS AND GYNECOLOGY | Facility: CLINIC | Age: 27
End: 2017-06-14

## 2017-06-14 NOTE — TELEPHONE ENCOUNTER
----- Message from Ketty Krishnamurthy sent at 6/14/2017  2:27 PM CDT -----  Contact: Patient   Patient request a call back at [phone], Regards to some question that she has.    Thanks  td

## 2017-06-14 NOTE — TELEPHONE ENCOUNTER
Pt was calling in regards to questions and instructions for her baby that were given by the pediatrician. Pt instructed to follow pediatricians instructions that were given and to call and ask him if any new questions arise. Patient verbalized understanding

## 2017-06-23 ENCOUNTER — POSTPARTUM VISIT (OUTPATIENT)
Dept: OBSTETRICS AND GYNECOLOGY | Facility: CLINIC | Age: 27
End: 2017-06-23
Payer: MEDICAID

## 2017-06-23 VITALS
SYSTOLIC BLOOD PRESSURE: 120 MMHG | BODY MASS INDEX: 34.51 KG/M2 | HEIGHT: 66 IN | WEIGHT: 214.75 LBS | DIASTOLIC BLOOD PRESSURE: 72 MMHG

## 2017-06-23 DIAGNOSIS — Z30.09 STERILIZATION CONSULT: Primary | ICD-10-CM

## 2017-06-23 PROCEDURE — 99999 PR PBB SHADOW E&M-EST. PATIENT-LVL III: CPT | Mod: PBBFAC,,, | Performed by: OBSTETRICS & GYNECOLOGY

## 2017-06-23 PROCEDURE — 99213 OFFICE O/P EST LOW 20 MIN: CPT | Mod: S$PBB,,, | Performed by: OBSTETRICS & GYNECOLOGY

## 2017-06-23 PROCEDURE — 99213 OFFICE O/P EST LOW 20 MIN: CPT | Mod: PBBFAC | Performed by: OBSTETRICS & GYNECOLOGY

## 2017-06-26 NOTE — PROGRESS NOTES
Subjective:       Patient ID: Yaa Richmond is a 26 y.o. female.    Chief Complaint:  Postpartum Care (BTL CONSULT) and Surgical Consult      History of Present Illness  HPI  25 yo  s/p  18 here for BTL scheduling. Medicaid consents signed 3/23/17. Confirms desire for permanent sterilization    GYN & OB History  No LMP recorded.   Date of Last Pap: 2016    OB History    Para Term  AB Living   4 4 4     4   SAB TAB Ectopic Multiple Live Births         0 4      # Outcome Date GA Lbr Philippe/2nd Weight Sex Delivery Anes PTL Lv   4 Term 17 38w6d / 00:01 3.71 kg (8 lb 2.9 oz) M Vag-Spont EPI N RODOLFO   3 Term 14 39w1d 06:19 / 00:18 3.487 kg (7 lb 11 oz) M Vag-Spont EPI N RODOLFO   2 Term 09 40w0d  3.374 kg (7 lb 7 oz) F Vag-Spont EPI N RODOLFO   1 Term 08 39w0d  3.118 kg (6 lb 14 oz) F Vag-Spont EPI N RODOLFO          Review of Systems  Review of Systems   All other systems reviewed and are negative.          Objective:    Physical Exam:   Constitutional: She is oriented to person, place, and time. She appears well-developed and well-nourished.        Pulmonary/Chest: Effort normal.        Abdominal: Soft.             Musculoskeletal: Normal range of motion.       Neurological: She is alert and oriented to person, place, and time.    Skin: Skin is warm and dry.    Psychiatric: She has a normal mood and affect. Her behavior is normal.          Assessment:        1. Sterilization consult         Plan:      1. PP appt w/ me -will do pelvic exam then  2. To OR for lap BTL-informed consents done

## 2017-07-13 ENCOUNTER — POSTPARTUM VISIT (OUTPATIENT)
Dept: OBSTETRICS AND GYNECOLOGY | Facility: CLINIC | Age: 27
End: 2017-07-13
Payer: MEDICAID

## 2017-07-13 ENCOUNTER — TELEPHONE (OUTPATIENT)
Dept: OBSTETRICS AND GYNECOLOGY | Facility: CLINIC | Age: 27
End: 2017-07-13

## 2017-07-13 VITALS
BODY MASS INDEX: 34.58 KG/M2 | HEIGHT: 66 IN | WEIGHT: 215.19 LBS | SYSTOLIC BLOOD PRESSURE: 120 MMHG | DIASTOLIC BLOOD PRESSURE: 74 MMHG

## 2017-07-13 DIAGNOSIS — Z30.2 ENCOUNTER FOR FEMALE STERILIZATION PROCEDURE: Primary | ICD-10-CM

## 2017-07-13 DIAGNOSIS — Z30.2 ENCOUNTER FOR STERILIZATION: ICD-10-CM

## 2017-07-13 PROCEDURE — 99999 PR PBB SHADOW E&M-EST. PATIENT-LVL IV: CPT | Mod: PBBFAC,,, | Performed by: OBSTETRICS & GYNECOLOGY

## 2017-07-13 PROCEDURE — 0503F POSTPARTUM CARE VISIT: CPT | Mod: ,,, | Performed by: OBSTETRICS & GYNECOLOGY

## 2017-07-13 PROCEDURE — 99214 OFFICE O/P EST MOD 30 MIN: CPT | Mod: PBBFAC | Performed by: OBSTETRICS & GYNECOLOGY

## 2017-07-13 RX ORDER — KETOROLAC TROMETHAMINE 30 MG/ML
30 INJECTION, SOLUTION INTRAMUSCULAR; INTRAVENOUS EVERY 6 HOURS
Status: CANCELLED | OUTPATIENT
Start: 2017-07-13 | End: 2017-07-14

## 2017-07-13 RX ORDER — IBUPROFEN 200 MG
800 TABLET ORAL EVERY 8 HOURS
Status: CANCELLED | OUTPATIENT
Start: 2017-07-14

## 2017-07-13 RX ORDER — ONDANSETRON 4 MG/1
8 TABLET, ORALLY DISINTEGRATING ORAL EVERY 8 HOURS PRN
Status: CANCELLED | OUTPATIENT
Start: 2017-07-13

## 2017-07-13 NOTE — H&P
"Yaa Richmond 26 y.o.  with undesired fertility, desiring permanent sterilization.    Past Medical History:   Diagnosis Date    First degree perineal laceration during delivery 2014       History reviewed. No pertinent surgical history.    Family History   Problem Relation Age of Onset    No Known Problems Father     No Known Problems Mother     Breast cancer Neg Hx     Cancer Neg Hx     Diabetes Neg Hx     Colon cancer Neg Hx     Eclampsia Neg Hx     Hypertension Neg Hx     Miscarriages / Stillbirths Neg Hx     Ovarian cancer Neg Hx      labor Neg Hx     Stroke Neg Hx        Social History     Social History    Marital status:      Spouse name: N/A    Number of children: N/A    Years of education: N/A     Social History Main Topics    Smoking status: Current Every Day Smoker     Packs/day: 1.00     Types: Cigarettes    Smokeless tobacco: Never Used    Alcohol use No    Drug use: No    Sexual activity: Not Currently     Partners: Male     Birth control/ protection: None     Other Topics Concern    None     Social History Narrative    None       No current outpatient prescriptions on file.     No current facility-administered medications for this visit.        Review of patient's allergies indicates:  No Known Allergies    Vitals:    17 1018   BP: 120/74   Weight: 97.6 kg (215 lb 2.7 oz)   Height: 5' 6" (1.676 m)     PE:  GENERAL: NAD, A&O  CHEST: normal effort  ABDOMEN: soft, NT/ND, obese  : normal external genitalia/cervix/vaginal mucosa/bimanual exam    A/P  1. Undesired fertility  2. To OR for lap BTL 18-informed consents re-signed  "

## 2017-07-13 NOTE — TELEPHONE ENCOUNTER
Lap BTL is schedule. Added to calendar and book. Preadmit and lab appointments made on 7/17/17.  Patient verbalized understanding of appointments.

## 2017-07-13 NOTE — PROGRESS NOTES
"CC: Post-partum follow-up    Yaa Richmond is a 26 y.o. female  who presents for post-partum visit.  She is S/P a .  She and the baby are doing well.  No pain.  No fever.   No bowel / bladder complaints.    Delivery Date: 2017  Delivery provider: Rossi Gayle  Gender: male  Birth Weight: 3710g  Breast Feeding: NO  Depression: NO  Contraception: bilateral tubal ligation-scheduled for 18    Pregnancy was complicated by:  Obesity, smoking    /74   Ht 5' 6" (1.676 m)   Wt 97.6 kg (215 lb 2.7 oz)   Breastfeeding? No   BMI 34.73 kg/m²     ROS:  GENERAL: No fever, chills, fatigability.  VULVAR: No pain, no lesions and no itching.  VAGINAL: No relaxation, no itching, no discharge, no abnormal bleeding and no lesions.  ABDOMEN: No abdominal pain. Denies nausea. Denies vomiting. No diarrhea. No constipation  BREAST: Denies pain. No lumps. No discharge.  URINARY: No incontinence, no nocturia, no frequency and no dysuria.  CARDIOVASCULAR: No chest pain. No shortness of breath. No leg cramps.  NEUROLOGICAL: No headaches. No vision changes.    PHYSICAL EXAM:  GENERAL: NAD  NECK: no thyromegaly  BREASTS: no abnormal masses/nontender  ABDOMEN:  Soft, non-tender, non-distended   VULVA:  Normal, no lesions  CERVIX:  Without lesions, polyps or tenderness.  UTERUS:  Normal size, shape, consistency, no mass or tenderness.  ADNEXA:  Normal in size without mass or tenderness    IMP:  Doing well S/P   Instructions / precautions reviewed  Contraceptive counseling      PLAN:  May resume normal activities  Return: [facility]; to OR for lap BTL      "

## 2017-07-13 NOTE — TELEPHONE ENCOUNTER
----- Message from Patsy Stoddard MD sent at 7/13/2017 10:51 AM CDT -----  Please add her to surgery schedule 7/19 for lap BTL. OR informed

## 2017-07-17 ENCOUNTER — HOSPITAL ENCOUNTER (OUTPATIENT)
Dept: PREADMISSION TESTING | Facility: HOSPITAL | Age: 27
Discharge: HOME | End: 2017-07-17
Attending: OBSTETRICS & GYNECOLOGY
Payer: MEDICAID

## 2017-07-17 VITALS — HEIGHT: 66 IN | WEIGHT: 213 LBS | BODY MASS INDEX: 34.23 KG/M2

## 2017-07-17 NOTE — DISCHARGE INSTRUCTIONS
To confirm, Your doctor has instructed you that surgery is scheduled for 7/19/17  at  11:45 am.       Please report to [facility], [address], 1st floor, main lobby by 10:15 am pre admit nurse will call afternoon prior to surgery for final arrival time.      INSTRUCTIONS IMPORTANT!!!   Do not eat, drink, or smoke after 12 midnight. May have water or clear liquid juice until 3 hrs prior to surgery. OK to brush teeth, no gum, candy or mints!    ¨ Take only these medicines with a small swallow of water-morning of surgery.  none    Pre operative instructions:  Please review the Pre-Operative Instruction booklet that you were given.        Bathing Instructions--See page 6 in the Pre-operative booklet.      Prevention of surgical site infections:     -Keep incisions clean and dry.   -Do not soak/submerge incisions in water until completely healed.   -Do not apply lotions, powders, creams, or deodorants to site.   -Always make sure hands are cleaned with antibacterial soap/ alcohol-based                 prior to touching the surgical site.  (This includes doctors,                 nurses, staff, and yourself.)    Signs and symptoms:   -Redness and pain around the area where you had surgery   -Drainage of cloudy fluid from your surgical wound   -Fever over 100.4       I have read or had read and explained to me, and understand the above information.  Additional comments or instructions:  Received a copy of Pre-operative instructions booklet, FAQ surgical site infection sheet, and packets of hibiclens (if indicated).

## 2017-07-18 ENCOUNTER — ANESTHESIA EVENT (OUTPATIENT)
Dept: SURGERY | Facility: HOSPITAL | Age: 27
End: 2017-07-18
Payer: MEDICAID

## 2017-07-19 ENCOUNTER — ANESTHESIA (OUTPATIENT)
Dept: SURGERY | Facility: HOSPITAL | Age: 27
End: 2017-07-19
Payer: MEDICAID

## 2017-07-20 ENCOUNTER — HOSPITAL ENCOUNTER (OUTPATIENT)
Facility: HOSPITAL | Age: 27
Discharge: HOME | End: 2017-07-20
Attending: OBSTETRICS & GYNECOLOGY | Admitting: OBSTETRICS & GYNECOLOGY
Payer: MEDICAID

## 2017-07-20 VITALS
BODY MASS INDEX: 34.61 KG/M2 | TEMPERATURE: 98 F | HEIGHT: 66 IN | OXYGEN SATURATION: 100 % | RESPIRATION RATE: 10 BRPM | WEIGHT: 215.38 LBS | DIASTOLIC BLOOD PRESSURE: 58 MMHG | HEART RATE: 62 BPM | SYSTOLIC BLOOD PRESSURE: 109 MMHG

## 2017-07-20 DIAGNOSIS — Z30.2 ENCOUNTER FOR STERILIZATION: ICD-10-CM

## 2017-07-20 PROCEDURE — 58671 LAPAROSCOPY TUBAL BLOCK: CPT | Mod: ,,, | Performed by: OBSTETRICS & GYNECOLOGY

## 2017-07-20 PROCEDURE — 27800903 OPTIME MED/SURG SUP & DEVICES OTHER IMPLANTS: Performed by: OBSTETRICS & GYNECOLOGY

## 2017-07-20 PROCEDURE — 63600175 PHARM REV CODE 636 W HCPCS: Performed by: OBSTETRICS & GYNECOLOGY

## 2017-07-20 PROCEDURE — 36000708 HC OR TIME LEV III 1ST 15 MIN: Performed by: OBSTETRICS & GYNECOLOGY

## 2017-07-20 PROCEDURE — 71000039 HC RECOVERY, EACH ADD'L HOUR: Performed by: OBSTETRICS & GYNECOLOGY

## 2017-07-20 PROCEDURE — 25000003 PHARM REV CODE 250: Performed by: NURSE ANESTHETIST, CERTIFIED REGISTERED

## 2017-07-20 PROCEDURE — 71000015 HC POSTOP RECOV 1ST HR: Performed by: OBSTETRICS & GYNECOLOGY

## 2017-07-20 PROCEDURE — 25000003 PHARM REV CODE 250: Performed by: ANESTHESIOLOGY

## 2017-07-20 PROCEDURE — 37000009 HC ANESTHESIA EA ADD 15 MINS: Performed by: OBSTETRICS & GYNECOLOGY

## 2017-07-20 PROCEDURE — 37000008 HC ANESTHESIA 1ST 15 MINUTES: Performed by: OBSTETRICS & GYNECOLOGY

## 2017-07-20 PROCEDURE — 71000033 HC RECOVERY, INTIAL HOUR: Performed by: OBSTETRICS & GYNECOLOGY

## 2017-07-20 PROCEDURE — 63600175 PHARM REV CODE 636 W HCPCS: Performed by: NURSE ANESTHETIST, CERTIFIED REGISTERED

## 2017-07-20 PROCEDURE — 63600175 PHARM REV CODE 636 W HCPCS: Performed by: ANESTHESIOLOGY

## 2017-07-20 PROCEDURE — 36000709 HC OR TIME LEV III EA ADD 15 MIN: Performed by: OBSTETRICS & GYNECOLOGY

## 2017-07-20 DEVICE — RING FALOPIAN RING: Type: IMPLANTABLE DEVICE | Site: FALLOPIAN TUBE | Status: FUNCTIONAL

## 2017-07-20 RX ORDER — IBUPROFEN 800 MG/1
800 TABLET ORAL EVERY 8 HOURS
Status: DISCONTINUED | OUTPATIENT
Start: 2017-07-21 | End: 2017-07-20 | Stop reason: HOSPADM

## 2017-07-20 RX ORDER — LIDOCAINE HCL/PF 100 MG/5ML
SYRINGE (ML) INTRAVENOUS
Status: DISCONTINUED | OUTPATIENT
Start: 2017-07-20 | End: 2017-07-20

## 2017-07-20 RX ORDER — KETOROLAC TROMETHAMINE 30 MG/ML
30 INJECTION, SOLUTION INTRAMUSCULAR; INTRAVENOUS EVERY 6 HOURS
Status: DISCONTINUED | OUTPATIENT
Start: 2017-07-20 | End: 2017-07-20 | Stop reason: HOSPADM

## 2017-07-20 RX ORDER — SODIUM CHLORIDE, SODIUM LACTATE, POTASSIUM CHLORIDE, CALCIUM CHLORIDE 600; 310; 30; 20 MG/100ML; MG/100ML; MG/100ML; MG/100ML
INJECTION, SOLUTION INTRAVENOUS CONTINUOUS
Status: DISCONTINUED | OUTPATIENT
Start: 2017-07-20 | End: 2017-07-20 | Stop reason: HOSPADM

## 2017-07-20 RX ORDER — ROPIVACAINE IN 0.9% SOD CHL/PF 0.2 %
PLASTIC BAG, INJECTION (ML) EPIDURAL CONTINUOUS
Status: DISCONTINUED | OUTPATIENT
Start: 2017-07-20 | End: 2017-07-20

## 2017-07-20 RX ORDER — ROCURONIUM BROMIDE 10 MG/ML
INJECTION, SOLUTION INTRAVENOUS
Status: DISCONTINUED | OUTPATIENT
Start: 2017-07-20 | End: 2017-07-20

## 2017-07-20 RX ORDER — ACETAMINOPHEN 10 MG/ML
1000 INJECTION, SOLUTION INTRAVENOUS ONCE
Status: COMPLETED | OUTPATIENT
Start: 2017-07-20 | End: 2017-07-20

## 2017-07-20 RX ORDER — HYDROMORPHONE HYDROCHLORIDE 2 MG/ML
0.2 INJECTION, SOLUTION INTRAMUSCULAR; INTRAVENOUS; SUBCUTANEOUS EVERY 5 MIN PRN
Status: DISCONTINUED | OUTPATIENT
Start: 2017-07-20 | End: 2017-07-20 | Stop reason: HOSPADM

## 2017-07-20 RX ORDER — FENTANYL CITRATE 50 UG/ML
INJECTION, SOLUTION INTRAMUSCULAR; INTRAVENOUS
Status: DISCONTINUED | OUTPATIENT
Start: 2017-07-20 | End: 2017-07-20

## 2017-07-20 RX ORDER — ONDANSETRON 2 MG/ML
INJECTION INTRAMUSCULAR; INTRAVENOUS
Status: DISCONTINUED | OUTPATIENT
Start: 2017-07-20 | End: 2017-07-20

## 2017-07-20 RX ORDER — HYDROCODONE BITARTRATE AND ACETAMINOPHEN 7.5; 325 MG/1; MG/1
1 TABLET ORAL
Qty: 10 TABLET | Refills: 0 | Status: SHIPPED | OUTPATIENT
Start: 2017-07-20 | End: 2017-08-24

## 2017-07-20 RX ORDER — LIDOCAINE HYDROCHLORIDE 10 MG/ML
1 INJECTION, SOLUTION EPIDURAL; INFILTRATION; INTRACAUDAL; PERINEURAL ONCE
Status: DISCONTINUED | OUTPATIENT
Start: 2017-07-20 | End: 2017-07-20 | Stop reason: HOSPADM

## 2017-07-20 RX ORDER — MIDAZOLAM HYDROCHLORIDE 1 MG/ML
INJECTION, SOLUTION INTRAMUSCULAR; INTRAVENOUS
Status: DISCONTINUED | OUTPATIENT
Start: 2017-07-20 | End: 2017-07-20

## 2017-07-20 RX ORDER — ONDANSETRON 8 MG/1
8 TABLET, ORALLY DISINTEGRATING ORAL EVERY 8 HOURS PRN
Status: DISCONTINUED | OUTPATIENT
Start: 2017-07-20 | End: 2017-07-20 | Stop reason: HOSPADM

## 2017-07-20 RX ORDER — ONDANSETRON 2 MG/ML
4 INJECTION INTRAMUSCULAR; INTRAVENOUS DAILY PRN
Status: DISCONTINUED | OUTPATIENT
Start: 2017-07-20 | End: 2017-07-20 | Stop reason: HOSPADM

## 2017-07-20 RX ORDER — MEPERIDINE HYDROCHLORIDE 50 MG/ML
12.5 INJECTION INTRAMUSCULAR; INTRAVENOUS; SUBCUTANEOUS ONCE AS NEEDED
Status: COMPLETED | OUTPATIENT
Start: 2017-07-20 | End: 2017-07-20

## 2017-07-20 RX ORDER — PROPOFOL 10 MG/ML
VIAL (ML) INTRAVENOUS
Status: DISCONTINUED | OUTPATIENT
Start: 2017-07-20 | End: 2017-07-20

## 2017-07-20 RX ADMIN — MEPERIDINE HYDROCHLORIDE 12.5 MG: 50 INJECTION INTRAMUSCULAR; INTRAVENOUS; SUBCUTANEOUS at 12:07

## 2017-07-20 RX ADMIN — ACETAMINOPHEN 1000 MG: 10 INJECTION, SOLUTION INTRAVENOUS at 12:07

## 2017-07-20 RX ADMIN — PROPOFOL 130 MG: 10 INJECTION, EMULSION INTRAVENOUS at 11:07

## 2017-07-20 RX ADMIN — KETOROLAC TROMETHAMINE 30 MG: 30 INJECTION, SOLUTION INTRAMUSCULAR at 12:07

## 2017-07-20 RX ADMIN — ROCURONIUM BROMIDE 30 MG: 10 INJECTION, SOLUTION INTRAVENOUS at 11:07

## 2017-07-20 RX ADMIN — FENTANYL CITRATE 100 MCG: 50 INJECTION, SOLUTION INTRAMUSCULAR; INTRAVENOUS at 11:07

## 2017-07-20 RX ADMIN — MIDAZOLAM HYDROCHLORIDE 2 MG: 1 INJECTION, SOLUTION INTRAMUSCULAR; INTRAVENOUS at 11:07

## 2017-07-20 RX ADMIN — HYDROMORPHONE HYDROCHLORIDE 0.2 MG: 2 INJECTION, SOLUTION INTRAMUSCULAR; INTRAVENOUS; SUBCUTANEOUS at 01:07

## 2017-07-20 RX ADMIN — ONDANSETRON 4 MG: 2 INJECTION, SOLUTION INTRAMUSCULAR; INTRAVENOUS at 11:07

## 2017-07-20 RX ADMIN — LIDOCAINE HYDROCHLORIDE 80 MG: 20 INJECTION, SOLUTION INTRAVENOUS at 11:07

## 2017-07-20 NOTE — PLAN OF CARE
Pt resting c/o low abd pain, iv pain med being administered. VSS. Edges approximated to surgical sites. Belle pad in place with no visible drainage. . Will cont to  Monitor. See flow sheet for detailed assessment.

## 2017-07-20 NOTE — ANESTHESIA RELEASE NOTE
"Anesthesia Release from PACU Note    Patient: Yaa Richmond    Procedure(s) Performed: Procedure(s) (LRB):  [id number] (Bilateral)    Anesthesia type: general    Post pain: Adequate analgesia    Post assessment: no apparent anesthetic complications    Last Vitals:   Visit Vitals  BP (!) 110/56 (BP Location: Right arm, Patient Position: Sitting, BP Method: Automatic)   Pulse 63   Temp 36.6 °C (97.9 °F) (Temporal)   Resp 18   Ht 5' 6" (1.676 m)   Wt 97.7 kg (215 lb 6.2 oz)   LMP 07/16/2017   SpO2 100%   Breastfeeding? No   BMI 34.76 kg/m²       Post vital signs: stable    Level of consciousness: awake    Nausea/Vomiting: no nausea/no vomiting    Complications: none    Airway Patency: patent    Respiratory: unassisted    Cardiovascular: stable and blood pressure at baseline    Hydration: euvolemic  "

## 2017-07-20 NOTE — INTERVAL H&P NOTE
The patient has been examined and the H&P has been reviewed:    I concur with the findings and no changes have occurred since H&P was written.    Anesthesia/Surgery risks, benefits and alternative options discussed and understood by patient/family.          Active Hospital Problems    Diagnosis  POA    Postpartum state [Z39.2]  Not Applicable      Resolved Hospital Problems    Diagnosis Date Resolved POA   No resolved problems to display.

## 2017-07-20 NOTE — DISCHARGE INSTRUCTIONS
General Information:    1.  Do not drink alcoholic beverages including beer for 24 hours or as long as you are on pain medication..  2.  Do not drive a motor vehicle, operate machinery or power tools, or signs legal papers for 24 hours or as long as you are on pain medication.   3.  You may experience light-headedness, dizziness, and sleepiness following surgery. Please do not stay alone. A responsible adult should be with you for this 24 hour period.  4.  Go home and rest.    5. Progress slowly to a normal diet unless instructed.  Otherwise, begin with liquids such as soft drinks, then soup and crackers working up to solid foods. Drink plenty of nonalcoholic fluids.  6.  Certain anesthetics and pain medications produce nausea and vomiting in certain       individuals. If nausea becomes a problem at home, call you doctor.    7. A nurse will be calling you sometime after surgery. Do not be alarmed. This is our way of finding out how you are doing.    8. Several times every hour while you are awake, take 2-3 deep breaths and cough. If you had stomach surgery hold a pillow or rolled towel firmly against your stomach before you cough. This will help with any pain the cough might cause.  9. Several times every hour while you are awake, pump and flex your feet 5-6 times and do foot circles. This will help prevent blood clots.    10.Call your doctor for severe pain, bleeding, fever, or signs or symptoms of infection (pain, swelling, redness, foul odor, drainage).    11.You can contact your doctor anytime by [phone] for the [facility] (at [facility]) or [phone] for the [facility] on [facility].   [email or web address] is another way to contact your doctor if you are an active participant online with [facility].        Discharge Instruction for Laparoscopic Fallopian Tube Ligation  Your doctor did a sterilization procedure to prevent any future pregnancies. This is a permanent form of birth  control. Several different methods of surgical sterilization can be used to block the fallopian tubes. They all stop the egg from entering the womb (uterus) and sperm from traveling up to fertilize the egg. You had a laparoscopic procedure. The incisions on your abdomen may be tender or sore. You may also have pain in your upper back or shoulders. This is from the gas used to enlarge the abdomen to allow your doctor to see inside your pelvis and do the procedure. This pain usually goes away in a day or two.  Here's what you can do to speed your recovery after surgery.   Home care  · Take it easy. Stay quiet and rest for 2 days.  · Return to your normal activities after 48 hours. You may also return to work at that time.  · Eat a normal diet.  · If needed, take an over-the-counter pain reliever for pain.  · Don't lift anything heavier than 10 pounds for 1 week after the procedure.  · Don't drive for at least 24 hours after the procedure and until pain is minimal without taking opioids if prescribed  · Don't have sex for 2 weeks after surgery.  Follow-up care  Make a follow-up appointment as directed by our staff.     When to call your healthcare provider  Call your healthcare provider right away if you have any of the following:  · Fever above 100.4°F (38°C) or higher, or as directed by your healthcare provider  · Chills  · Dizziness or fainting  · Abdominal pain and swelling that get worse  · Nausea and vomiting  · Signs of infection. These include drainage, pus, warmth, or redness at your incision site.  · Sudden chest pain or shortness of breath   Date Last Reviewed: 5/19/2015 © 2000-2016 [employer]. [address] [place] [address], [place] [address]. All rights reserved. This information is not intended as a substitute for professional medical care. Always follow your healthcare professional's instructions.        Acetaminophen; Hydrocodone tablets or capsules  What is this medicine?  ACETAMINOPHEN;  HYDROCODONE (a set a TEOFILO oscar fen; daija droe KOE done) is a pain reliever. It is used to treat moderate to severe pain.  How should I use this medicine?  Take this medicine by mouth with a glass of water. Follow the directions on the prescription label. You can take it with or without food. If it upsets your stomach, take it with food. Do not take your medicine more often than directed.  A special MedGuide will be given to you by the pharmacist with each prescription and refill. Be sure to read this information carefully each time.  Talk to your pediatrician regarding the use of this medicine in children. Special care may be needed.  What side effects may I notice from receiving this medicine?  Side effects that you should report to your doctor or health care professional as soon as possible:  · allergic reactions like skin rash, itching or hives, swelling of the face, lips, or tongue  · breathing problems  · confusion  · redness, blistering, peeling or loosening of the skin, including inside the mouth  · signs and symptoms of low blood pressure like dizziness; feeling faint or lightheaded, falls; unusually weak or tired  · trouble passing urine or change in the amount of urine  · yellowing of the eyes or skin  Side effects that usually do not require medical attention (report to your doctor or health care professional if they continue or are bothersome):  · constipation  · dry mouth  · nausea, vomiting  · tiredness  What may interact with this medicine?  This medicine may interact with the following medications:  · alcohol  · antiviral medicines for HIV or AIDS  · atropine  · antihistamines for allergy, cough and cold  · certain antibiotics like erythromycin, clarithromycin  · certain medicines for anxiety or sleep  · certain medicines for bladder problems like oxybutynin, tolterodine  · certain medicines for depression like amitriptyline, fluoxetine, sertraline  · certain medicines for fungal infections like  ketoconazole and itraconazole  · certain medicines for Parkinson's disease like benztropine, trihexyphenidyl  · certain medicines for seizures like carbamazepine, phenobarbital, phenytoin, primidone  · certain medicines for stomach problems like dicyclomine, hyoscyamine  · certain medicines for travel sickness like scopolamine  · general anesthetics like halothane, isoflurane, methoxyflurane, propofol  · ipratropium  · local anesthetics like lidocaine, pramoxine, tetracaine  · MAOIs like Carbex, Eldepryl, Marplan, Nardil, and Parnate  · medicines that relax muscles for surgery  · other medicines with acetaminophen  · other narcotic medicines for pain or cough  · phenothiazines like chlorpromazine, mesoridazine, prochlorperazine, thioridazine  · rifampin  What if I miss a dose?  If you miss a dose, take it as soon as you can. If it is almost time for your next dose, take only that dose. Do not take double or extra doses.  Where should I keep my medicine?  Keep out of the reach of children. This medicine can be abused. Keep your medicine in a safe place to protect it from theft. Do not share this medicine with anyone. Selling or giving away this medicine is dangerous and against the law.  This medicine may cause accidental overdose and death if it taken by other adults, children, or pets. Mix any unused medicine with a substance like cat litter or coffee grounds. Then throw the medicine away in a sealed container like a sealed bag or a coffee can with a lid. Do not use the medicine after the expiration date.  Store at room temperature between 15 and 30 degrees C (59 and 86 degrees F).  What should I tell my health care provider before I take this medicine?  They need to know if you have any of these conditions:  · brain tumor  · Crohn's disease, inflammatory bowel disease, or ulcerative colitis  · drug abuse or addiction  · head injury  · heart or circulation problems  · if you often drink alcohol  · kidney disease or  problems going to the bathroom  · liver disease  · lung disease, asthma, or breathing problems  · an unusual or allergic reaction to acetaminophen, hydrocodone, other opioid analgesics, other medicines, foods, dyes, or preservatives  · pregnant or trying to get pregnant  · breast-feeding  What should I watch for while using this medicine?  Tell your doctor or health care professional if your pain does not go away, if it gets worse, or if you have new or a different type of pain. You may develop tolerance to the medicine. Tolerance means that you will need a higher dose of the medicine for pain relief. Tolerance is normal and is expected if you take the medicine for a long time.  Do not suddenly stop taking your medicine because you may develop a severe reaction. Your body becomes used to the medicine. This does NOT mean you are addicted. Addiction is a behavior related to getting and using a drug for a non-medical reason. If you have pain, you have a medical reason to take pain medicine. Your doctor will tell you how much medicine to take. If your doctor wants you to stop the medicine, the dose will be slowly lowered over time to avoid any side effects.  There are different types of narcotic medicines (opiates). If you take more than one type at the same time or if you are taking another medicine that also causes drowsiness, you may have more side effects. Give your health care provider a list of all medicines you use. Your doctor will tell you how much medicine to take. Do not take more medicine than directed. Call emergency for help if you have problems breathing or unusual sleepiness.  Do not take other medicines that contain acetaminophen with this medicine. Always read labels carefully. If you have questions, ask your doctor or pharmacist.  If you take too much acetaminophen get medical help right away. Too much acetaminophen can be very dangerous and cause liver damage. Even if you do not have symptoms, it is  important to get help right away.  You may get drowsy or dizzy. Do not drive, use machinery, or do anything that needs mental alertness until you know how this medicine affects you. Do not stand or sit up quickly, especially if you are an older patient. This reduces the risk of dizzy or fainting spells. Alcohol may interfere with the effect of this medicine. Avoid alcoholic drinks.  The medicine will cause constipation. Try to have a bowel movement at least every 2 to 3 days. If you do not have a bowel movement for 3 days, call your doctor or health care professional.  Your mouth may get dry. Chewing sugarless gum or sucking hard candy, and drinking plenty of water may help. Contact your doctor if the problem does not go away or is severe.  Date Last Reviewed:   NOTE:This sheet is a summary. It may not cover all possible information. If you have questions about this medicine, talk to your doctor, pharmacist, or health care provider. Copyright© 2016 [employer]

## 2017-07-20 NOTE — PLAN OF CARE
Gave discharge instructions to patient and , verbalized understanding.  All questions answered to the satisfaction of patient and wife.

## 2017-07-20 NOTE — ANESTHESIA PREPROCEDURE EVALUATION
2017  Yaa Richmond is a 26 y.o., female.    Anesthesia Evaluation    I have reviewed the Patient Summary Reports.    I have reviewed the Nursing Notes.   I have reviewed the Medications.     Review of Systems  Anesthesia Hx:  No problems with previous Anesthesia  Denies Family Hx of Anesthesia complications.   Denies Personal Hx of Anesthesia complications.   Social:  Smoker    Hematology/Oncology:     Oncology Normal     EENT/Dental:EENT/Dental Normal   Cardiovascular:  Cardiovascular Normal     OB/GYN/PEDS:  , undesired fertility  2 months post partum   Neurological:  Neurology Normal        Physical Exam  General:  Obesity    Airway/Jaw/Neck:  Airway Findings: Mouth Opening: Normal General Airway Assessment: Adult  Mallampati: II      Dental:  Dental Findings: In tact   Chest/Lungs:  Chest/Lungs Findings: Clear to auscultation     Heart/Vascular:  Heart Findings: Rate: Normal             Anesthesia Plan  Type of Anesthesia, risks & benefits discussed:  Anesthesia Type:  general  Patient's Preference:   Intra-op Monitoring Plan:   Intra-op Monitoring Plan Comments:   Post Op Pain Control Plan: multimodal analgesia  Post Op Pain Control Plan Comments:   Induction:   IV  Beta Blocker:  Patient is not currently on a Beta-Blocker (No further documentation required).       Informed Consent: Patient understands risks and agrees with Anesthesia plan.  Questions answered. Anesthesia consent signed with patient.  ASA Score: 2     Day of Surgery Review of History & Physical: I have interviewed and examined the patient. I have reviewed the patient's H&P dated:  There are no significant changes.          Ready For Surgery From Anesthesia Perspective.

## 2017-07-20 NOTE — ANESTHESIA POSTPROCEDURE EVALUATION
"Anesthesia Post Evaluation    Patient: Yaa Richmond    Procedure(s) Performed: Procedure(s) (LRB):  [id number] (Bilateral)    Final Anesthesia Type: general  Patient location during evaluation: PACU  Patient participation: Yes- Able to Participate  Level of consciousness: awake and alert  Post-procedure vital signs: reviewed and stable  Pain management: adequate  Airway patency: patent  PONV status at discharge: No PONV  Anesthetic complications: no      Cardiovascular status: stable  Respiratory status: unassisted  Follow-up not needed.        Visit Vitals  BP (!) 109/58   Pulse 62   Temp 36.5 °C (97.7 °F) (Temporal)   Resp 10   Ht 5' 6" (1.676 m)   Wt 97.7 kg (215 lb 6.2 oz)   LMP 07/16/2017   SpO2 100%   Breastfeeding? No   BMI 34.76 kg/m²       Pain/Conor Score: Pain Assessment Performed: Yes (7/20/2017 12:45 PM)  Presence of Pain: complains of pain/discomfort (7/20/2017  1:00 PM)  Pain Rating Prior to Med Admin: 6 (7/20/2017  1:01 PM)  Conor Score: 9 (7/20/2017  1:00 PM)      "

## 2017-07-20 NOTE — OP NOTE
DATE OF PROCEDURE: 7/20/17                                                                                                                PREOPERATIVE DIAGNOSES:  Undesired fertility.                                                                                                             POSTOPERATIVE DIAGNOSES:  Undesired fertility.                                                                                                            PROCEDURE:  Laparoscopic bilateral tubal ligation with Falope rings.                                                                                      SURGEON:  Patsy Stoddard M.D.                                                                                                                               ASSISTANT:  Meg ___, MSIII      ANESTHESIA: general      EBL: <5mL       COMPLICATIONS:  None.                                                                                                                                     SPECIMENS:  None.                                                                                                                                         ANESTHESIA:  General endotracheal anesthesia.                                                                                                             OPERATIVE FINDINGS:  Normal-size uterus, and bilateral adnexa, no adhesive   disease, normal liver edge                                                                                          DETAILS OF PROCEDURE:  After informed consent, the patient, Yaa Richmond, was   brought to the Operating Room. A time out was performed and the correct patient and   procedure were confirmed. She was given general anesthesia without            difficulty, prepped and draped in sterile fashion in dorsal lithotomy        position.  We placed a weighted speculum in the vagina.  Anterior lip of     the cervix was grasped with single-tooth tenaculum.  The  uterus was          sounded appropriately.  Hegar dilators were used to dilate the cervix and a ZUMI     uterine manipulator was inserted.  We tented the umbilicus, placed a         Veress needle directly into the umbilical site.  Entry into the peritoneal          cavity was confirmed with a water-filled syringe.  We insufflated the abdomen       with CO2 gas to obtain a pneumoperitoneum.  We then removed Veress needle,        made a 5-mm incision periumbilically, and placed a 5-mm trocar and sleeve.  We    had good release of gas.  The laparoscope was inserted.  Survey of the       abdomen revealed the above findings.  Bilateral fallopian tubes were         identified and followed to the fimbria.  We placed a midline suprapubic      8-mm trocar and sleeve under direct visualization and a Falope ring         applicator was inserted.  Bilateral fallopian tubes were again               identified, grasped, and the Falope rings were deployed and good blanching knuckle of    tube was cinched off.  All sites again were noted to be hemostatic.  The     instruments were removed from the abdomen.  The CO2 gas was released.        The skin trocar incisions were closed appropriately.  All instrument and lap     counts were correct.

## 2017-07-20 NOTE — TRANSFER OF CARE
"Anesthesia Transfer of Care Note    Patient: Yaa Richmond    Procedure(s) Performed: Procedure(s) (LRB):  [id number] (Bilateral)    Patient location: PACU    Anesthesia Type: general    Transport from OR: Transported from OR on room air with adequate spontaneous ventilation    Post pain: adequate analgesia    Post assessment: no apparent anesthetic complications    Post vital signs: stable    Level of consciousness: awake    Nausea/Vomiting: no nausea/vomiting    Complications: none    Transfer of care protocol was followed      Last vitals:   Visit Vitals  BP (!) 110/56 (BP Location: Right arm, Patient Position: Sitting, BP Method: Automatic)   Pulse 63   Temp 36.6 °C (97.9 °F) (Temporal)   Resp 18   Ht 5' 6" (1.676 m)   Wt 97.7 kg (215 lb 6.2 oz)   LMP 07/16/2017   SpO2 100%   Breastfeeding? No   BMI 34.76 kg/m²     "

## 2017-07-24 ENCOUNTER — DOCUMENTATION ONLY (OUTPATIENT)
Dept: OBSTETRICS AND GYNECOLOGY | Facility: CLINIC | Age: 27
End: 2017-07-24

## 2017-07-24 NOTE — PROGRESS NOTES
"Patient came by the office saying her "dressing was coming off". I put the patient into a room and the glue on the incision at her navel was slightly coming up. I advised the patient to not touch it and don't pull it but if she needs to she can place a bandaid over her navel if her clothes are pulling on it. Patient wanted to know when she could resume intercourse. I consulted with Dr Stoddard via telephone and she said that the patient could resume intercourse whenever she feels up to it.  "

## 2017-08-23 ENCOUNTER — TELEPHONE (OUTPATIENT)
Dept: OBSTETRICS AND GYNECOLOGY | Facility: CLINIC | Age: 27
End: 2017-08-23

## 2017-08-23 NOTE — TELEPHONE ENCOUNTER
----- Message from Lucero Schrader sent at 8/23/2017  2:19 PM CDT -----  Contact: pt  Pt returning nurse call, please call pt @ [phone].

## 2017-08-24 ENCOUNTER — OFFICE VISIT (OUTPATIENT)
Dept: OBSTETRICS AND GYNECOLOGY | Facility: CLINIC | Age: 27
End: 2017-08-24
Payer: MEDICAID

## 2017-08-24 VITALS
BODY MASS INDEX: 35.43 KG/M2 | SYSTOLIC BLOOD PRESSURE: 100 MMHG | DIASTOLIC BLOOD PRESSURE: 60 MMHG | WEIGHT: 220.44 LBS | HEIGHT: 66 IN

## 2017-08-24 DIAGNOSIS — Z98.890 POST-OPERATIVE STATE: Primary | ICD-10-CM

## 2017-08-24 PROCEDURE — 99999 PR PBB SHADOW E&M-EST. PATIENT-LVL II: CPT | Mod: PBBFAC,,, | Performed by: OBSTETRICS & GYNECOLOGY

## 2017-08-24 PROCEDURE — 99024 POSTOP FOLLOW-UP VISIT: CPT | Mod: ,,, | Performed by: OBSTETRICS & GYNECOLOGY

## 2017-08-24 PROCEDURE — 99212 OFFICE O/P EST SF 10 MIN: CPT | Mod: PBBFAC | Performed by: OBSTETRICS & GYNECOLOGY

## 2017-08-24 NOTE — PROGRESS NOTES
Patient presents today for postoperative follow up.  Pt. underwent lap BTL on 7/20/17.  Patient is recovering well and has no complaints today.    Physical Exam:  General - no acute distress  Abdomen - soft, nontender and nondistended.  No masses.  Incisions are well healed with no evidence of infection.  Extremities - nontender and no edema noted.    Encounter Diagnoses   Name Primary?    Post-operative state Yes       PLAN:  Follow up as needed.

## 2018-03-26 ENCOUNTER — TELEPHONE (OUTPATIENT)
Dept: OBSTETRICS AND GYNECOLOGY | Facility: CLINIC | Age: 28
End: 2018-03-26

## 2018-03-26 NOTE — TELEPHONE ENCOUNTER
Patient calls with questions regarding med for weight loss.  Patient is instructed to call her primary care physician for this concern.  Patient verbalized understanding of advice.

## 2018-03-26 NOTE — TELEPHONE ENCOUNTER
----- Message from Lucero Schrader sent at 3/26/2018 11:03 AM CDT -----  Contact: pt  Please call pt [phone], pt have some questions before scheduling appt.

## 2022-05-27 NOTE — PROGRESS NOTES
A 4 month F/U was conducted with patient. She states that he has recently started doing the elliptical 3x/week and would like to do it everyday.       A body composition was conducted and patient was educated on results. She has lost 4 lbs. Since her 2 month postop visit. She reports that she is eating cookies that her  offers her at night.    PLAN:  - Patient will do elliptical 3-5x/week.  - Patient will follow dietary advice from [employer] specifically increasing protein intake.  - Patient will practice self-discipline with treats.    It is my professional opinion that patient is in the preparation stage of behavior change.    Skyla Ramirez, ISSAC, CPT, CHC   Please see report in [employer].

## 2022-09-12 ENCOUNTER — TELEPHONE (OUTPATIENT)
Dept: OBSTETRICS AND GYNECOLOGY | Facility: CLINIC | Age: 32
End: 2022-09-12
Payer: MEDICAID

## 2022-09-12 NOTE — TELEPHONE ENCOUNTER
Rtn pt call  pt requesting appt, advised pt that last visit was 2017, not accepting new medicaid pts at this time     Sherri WASHINGTON LPN  OB/GYN

## (undated) DEVICE — SYR 3CC LUER LOC

## (undated) DEVICE — ELECTRODE REM PLYHSV RETURN 9

## (undated) DEVICE — DRAPE LAVH LAPAROSCOPY W/FLUID

## (undated) DEVICE — TUBING HEATED INSUFFLATOR

## (undated) DEVICE — Device

## (undated) DEVICE — APPLICATOR CHLORAPREP ORN 26ML

## (undated) DEVICE — TROCAR ENDOPATH XCEL 5X100MM

## (undated) DEVICE — SEE MEDLINE ITEM 157117

## (undated) DEVICE — SEE MEDLINE ITEM 154981

## (undated) DEVICE — MANIFOLD 4 PORT

## (undated) DEVICE — GLOVE SURGICAL LATEX SZ 6.5

## (undated) DEVICE — KIT ANTIFOG

## (undated) DEVICE — NDL PNEUMO INSUFFLATI 120MM

## (undated) DEVICE — MANIPULATOR UTERINE

## (undated) DEVICE — ADHESIVE DERMABOND ADVANCED

## (undated) DEVICE — COVER OVERHEAD SURG LT BLUE

## (undated) DEVICE — GLOVE SURGICAL LATEX SZ 6

## (undated) DEVICE — GLOVE SURGICAL LATEX SZ 7

## (undated) DEVICE — TROCAR ENDOPATH XCEL 8MM 10CM

## (undated) DEVICE — SYR 10CC LUER LOCK

## (undated) DEVICE — SEE MEDLINE ITEM 157027

## (undated) DEVICE — SEE MEDLINE ITEM 152739